# Patient Record
Sex: FEMALE | Race: WHITE | NOT HISPANIC OR LATINO | Employment: OTHER | URBAN - METROPOLITAN AREA
[De-identification: names, ages, dates, MRNs, and addresses within clinical notes are randomized per-mention and may not be internally consistent; named-entity substitution may affect disease eponyms.]

---

## 2017-03-28 ENCOUNTER — AMBULATORY - HEALTHEAST (OUTPATIENT)
Dept: CARDIOLOGY | Facility: CLINIC | Age: 77
End: 2017-03-28

## 2017-03-28 DIAGNOSIS — I34.0 MITRAL REGURGITATION: ICD-10-CM

## 2017-03-28 DIAGNOSIS — I10 HTN (HYPERTENSION): ICD-10-CM

## 2017-04-17 ENCOUNTER — RECORDS - HEALTHEAST (OUTPATIENT)
Dept: LAB | Facility: CLINIC | Age: 77
End: 2017-04-17

## 2017-04-17 LAB
CHOLEST SERPL-MCNC: 160 MG/DL
FASTING STATUS PATIENT QL REPORTED: YES
HDLC SERPL-MCNC: 47 MG/DL
LDLC SERPL CALC-MCNC: 88 MG/DL
TRIGL SERPL-MCNC: 124 MG/DL

## 2017-05-11 ENCOUNTER — ANESTHESIA - HEALTHEAST (OUTPATIENT)
Dept: SURGERY | Facility: HOSPITAL | Age: 77
End: 2017-05-11

## 2017-05-11 ASSESSMENT — MIFFLIN-ST. JEOR: SCORE: 977.57

## 2017-05-12 ENCOUNTER — SURGERY - HEALTHEAST (OUTPATIENT)
Dept: SURGERY | Facility: HOSPITAL | Age: 77
End: 2017-05-12

## 2017-05-23 ENCOUNTER — ANESTHESIA - HEALTHEAST (OUTPATIENT)
Dept: SURGERY | Facility: CLINIC | Age: 77
End: 2017-05-23

## 2017-05-24 ENCOUNTER — SURGERY - HEALTHEAST (OUTPATIENT)
Dept: SURGERY | Facility: CLINIC | Age: 77
End: 2017-05-24

## 2017-05-24 ASSESSMENT — MIFFLIN-ST. JEOR: SCORE: 977.57

## 2017-05-25 ENCOUNTER — HOME CARE/HOSPICE - HEALTHEAST (OUTPATIENT)
Dept: HOME HEALTH SERVICES | Facility: HOME HEALTH | Age: 77
End: 2017-05-25

## 2017-05-27 ENCOUNTER — COMMUNICATION - HEALTHEAST (OUTPATIENT)
Dept: SCHEDULING | Facility: CLINIC | Age: 77
End: 2017-05-27

## 2017-05-29 ENCOUNTER — HOME CARE/HOSPICE - HEALTHEAST (OUTPATIENT)
Dept: HOME HEALTH SERVICES | Facility: HOME HEALTH | Age: 77
End: 2017-05-29

## 2017-05-31 ENCOUNTER — HOME CARE/HOSPICE - HEALTHEAST (OUTPATIENT)
Dept: HOME HEALTH SERVICES | Facility: HOME HEALTH | Age: 77
End: 2017-05-31

## 2017-05-31 ENCOUNTER — AMBULATORY - HEALTHEAST (OUTPATIENT)
Dept: HOME HEALTH SERVICES | Facility: HOME HEALTH | Age: 77
End: 2017-05-31

## 2017-06-02 ENCOUNTER — HOME CARE/HOSPICE - HEALTHEAST (OUTPATIENT)
Dept: HOME HEALTH SERVICES | Facility: HOME HEALTH | Age: 77
End: 2017-06-02

## 2017-06-05 ENCOUNTER — HOME CARE/HOSPICE - HEALTHEAST (OUTPATIENT)
Dept: HOME HEALTH SERVICES | Facility: HOME HEALTH | Age: 77
End: 2017-06-05

## 2017-06-07 ENCOUNTER — HOME CARE/HOSPICE - HEALTHEAST (OUTPATIENT)
Dept: HOME HEALTH SERVICES | Facility: HOME HEALTH | Age: 77
End: 2017-06-07

## 2017-06-09 ENCOUNTER — HOME CARE/HOSPICE - HEALTHEAST (OUTPATIENT)
Dept: HOME HEALTH SERVICES | Facility: HOME HEALTH | Age: 77
End: 2017-06-09

## 2017-06-12 ENCOUNTER — HOME CARE/HOSPICE - HEALTHEAST (OUTPATIENT)
Dept: HOME HEALTH SERVICES | Facility: HOME HEALTH | Age: 77
End: 2017-06-12

## 2017-06-15 ENCOUNTER — HOSPITAL ENCOUNTER (OUTPATIENT)
Dept: CARDIOLOGY | Facility: HOSPITAL | Age: 77
Discharge: HOME | End: 2017-06-15
Attending: INTERNAL MEDICINE

## 2017-06-15 DIAGNOSIS — I10 HTN (HYPERTENSION): ICD-10-CM

## 2017-06-15 DIAGNOSIS — I34.0 MITRAL REGURGITATION: ICD-10-CM

## 2017-06-15 LAB
AORTIC ROOT: 3.3 CM
ASCENDING AORTA: 3 CM
BSA FOR ECHO PROCEDURE: 1.56 M2
CV BLOOD PRESSURE: NORMAL MMHG
CV ECHO HEIGHT: 60 IN
CV ECHO WEIGHT: 127 LBS
DOP CALC LVOT AREA: 3.14 CM2
DOP CALC LVOT DIAMETER: 2 CM
DOP CALC LVOT PEAK VEL: 90.2 CM/S
DOP CALC LVOT STROKE VOLUME: 69.7 CM3
DOP CALCLVOT PEAK VEL VTI: 22.2 CM
EJECTION FRACTION: 48 % (ref 55–75)
FRACTIONAL SHORTENING: 32.5 % (ref 28–44)
INTERVENTRICULAR SEPTUM IN END DIASTOLE: 1.21 CM (ref 0.6–0.9)
IVS/PW RATIO: 1.3
LA AREA 1: 27 CM2
LA AREA 2: 27.1 CM2
LEFT ATRIUM LENGTH: 6 CM
LEFT ATRIUM SIZE: 4.1 CM
LEFT ATRIUM TO AORTIC ROOT RATIO: 1.24 NO UNITS
LEFT ATRIUM VOLUME INDEX: 66.4 ML/M2
LEFT ATRIUM VOLUME: 103.7 CM3
LEFT VENTRICLE CARDIAC INDEX: 3.3 L/MIN/M2
LEFT VENTRICLE CARDIAC OUTPUT: 5.1 L/MIN
LEFT VENTRICLE DIASTOLIC VOLUME INDEX: 36.7 CM3/M2 (ref 34–74)
LEFT VENTRICLE DIASTOLIC VOLUME: 57.2 CM3 (ref 46–106)
LEFT VENTRICLE HEART RATE: 73 BPM
LEFT VENTRICLE MASS INDEX: 113.8 G/M2
LEFT VENTRICLE SYSTOLIC VOLUME INDEX: 19.2 CM3/M2 (ref 11–31)
LEFT VENTRICLE SYSTOLIC VOLUME: 29.9 CM3 (ref 14–42)
LEFT VENTRICULAR INTERNAL DIMENSION IN DIASTOLE: 4.62 CM (ref 3.8–5.2)
LEFT VENTRICULAR INTERNAL DIMENSION IN SYSTOLE: 3.12 CM (ref 2.2–3.5)
LEFT VENTRICULAR MASS: 177.5 G
LEFT VENTRICULAR OUTFLOW TRACT MEAN GRADIENT: 2 MMHG
LEFT VENTRICULAR OUTFLOW TRACT MEAN VELOCITY: 60.5 CM/S
LEFT VENTRICULAR OUTFLOW TRACT PEAK GRADIENT: 3 MMHG
LEFT VENTRICULAR POSTERIOR WALL IN END DIASTOLE: 0.95 CM (ref 0.6–0.9)
LV STROKE VOLUME INDEX: 44.7 ML/M2
MITRAL VALVE E/A RATIO: 0.9
MV AVERAGE E/E' RATIO: 16.3 CM/S
MV DECELERATION TIME: 283 MS
MV E'TISSUE VEL-LAT: 6.77 CM/S
MV E'TISSUE VEL-MED: 5.03 CM/S
MV LATERAL E/E' RATIO: 14.2
MV MEDIAL E/E' RATIO: 19.1
MV PEAK A VELOCITY: 103 CM/S
MV PEAK E VELOCITY: 96 CM/S
NUC REST DIASTOLIC VOLUME INDEX: 2032 LBS
NUC REST SYSTOLIC VOLUME INDEX: 60 IN
TRICUSPID VALVE ANULAR PLANE SYSTOLIC EXCURSION: 1.5 CM

## 2017-06-15 ASSESSMENT — MIFFLIN-ST. JEOR: SCORE: 977.57

## 2017-06-19 ENCOUNTER — AMBULATORY - HEALTHEAST (OUTPATIENT)
Dept: CARDIOLOGY | Facility: CLINIC | Age: 77
End: 2017-06-19

## 2017-06-22 ENCOUNTER — OFFICE VISIT - HEALTHEAST (OUTPATIENT)
Dept: CARDIOLOGY | Facility: CLINIC | Age: 77
End: 2017-06-22

## 2017-06-22 ENCOUNTER — AMBULATORY - HEALTHEAST (OUTPATIENT)
Dept: CARDIOLOGY | Facility: CLINIC | Age: 77
End: 2017-06-22

## 2017-06-22 DIAGNOSIS — I34.1 MITRAL VALVE PROLAPSE: ICD-10-CM

## 2017-06-22 DIAGNOSIS — I34.0 MITRAL REGURGITATION: ICD-10-CM

## 2017-06-22 ASSESSMENT — MIFFLIN-ST. JEOR: SCORE: 963.96

## 2017-06-28 ENCOUNTER — COMMUNICATION - HEALTHEAST (OUTPATIENT)
Dept: CARDIOLOGY | Facility: CLINIC | Age: 77
End: 2017-06-28

## 2017-06-29 ENCOUNTER — ANESTHESIA - HEALTHEAST (OUTPATIENT)
Dept: SURGERY | Facility: HOSPITAL | Age: 77
End: 2017-06-29

## 2017-06-29 ASSESSMENT — MIFFLIN-ST. JEOR: SCORE: 963.96

## 2017-06-30 ENCOUNTER — SURGERY - HEALTHEAST (OUTPATIENT)
Dept: SURGERY | Facility: HOSPITAL | Age: 77
End: 2017-06-30

## 2017-07-11 ENCOUNTER — COMMUNICATION - HEALTHEAST (OUTPATIENT)
Dept: CARDIOLOGY | Facility: CLINIC | Age: 77
End: 2017-07-11

## 2017-11-24 ENCOUNTER — RECORDS - HEALTHEAST (OUTPATIENT)
Dept: ADMINISTRATIVE | Facility: OTHER | Age: 77
End: 2017-11-24

## 2017-11-24 LAB
LAB AP CHARGES (HE HISTORICAL CONVERSION): NORMAL
PATH REPORT.COMMENTS IMP SPEC: NORMAL
PATH REPORT.COMMENTS IMP SPEC: NORMAL
PATH REPORT.FINAL DX SPEC: NORMAL
PATH REPORT.GROSS SPEC: NORMAL
PATH REPORT.MICROSCOPIC SPEC OTHER STN: NORMAL
PATH REPORT.RELEVANT HX SPEC: NORMAL
RESULT FLAG (HE HISTORICAL CONVERSION): NORMAL

## 2018-05-07 ENCOUNTER — HOSPITAL ENCOUNTER (OUTPATIENT)
Dept: MAMMOGRAPHY | Facility: CLINIC | Age: 78
Discharge: HOME | End: 2018-05-07
Attending: FAMILY MEDICINE

## 2018-05-07 DIAGNOSIS — Z12.31 VISIT FOR SCREENING MAMMOGRAM: ICD-10-CM

## 2018-09-27 ENCOUNTER — RECORDS - HEALTHEAST (OUTPATIENT)
Dept: ADMINISTRATIVE | Facility: OTHER | Age: 78
End: 2018-09-27

## 2018-09-27 ENCOUNTER — AMBULATORY - HEALTHEAST (OUTPATIENT)
Dept: CARDIOLOGY | Facility: CLINIC | Age: 78
End: 2018-09-27

## 2018-09-28 ENCOUNTER — OFFICE VISIT - HEALTHEAST (OUTPATIENT)
Dept: CARDIOLOGY | Facility: CLINIC | Age: 78
End: 2018-09-28

## 2018-09-28 DIAGNOSIS — I34.0 NON-RHEUMATIC MITRAL REGURGITATION: ICD-10-CM

## 2018-12-20 ENCOUNTER — RECORDS - HEALTHEAST (OUTPATIENT)
Dept: LAB | Facility: CLINIC | Age: 78
End: 2018-12-20

## 2018-12-20 LAB
ALBUMIN SERPL-MCNC: 4.1 G/DL (ref 3.5–5)
ALP SERPL-CCNC: 90 U/L (ref 45–120)
ALT SERPL W P-5'-P-CCNC: 20 U/L (ref 0–45)
ANION GAP SERPL CALCULATED.3IONS-SCNC: 10 MMOL/L (ref 5–18)
AST SERPL W P-5'-P-CCNC: 18 U/L (ref 0–40)
BASOPHILS # BLD AUTO: 0.1 THOU/UL (ref 0–0.2)
BASOPHILS NFR BLD AUTO: 1 % (ref 0–2)
BILIRUB SERPL-MCNC: 0.4 MG/DL (ref 0–1)
BUN SERPL-MCNC: 20 MG/DL (ref 8–28)
CALCIUM SERPL-MCNC: 9.8 MG/DL (ref 8.5–10.5)
CHLORIDE BLD-SCNC: 107 MMOL/L (ref 98–107)
CHOLEST SERPL-MCNC: 168 MG/DL
CO2 SERPL-SCNC: 25 MMOL/L (ref 22–31)
CREAT SERPL-MCNC: 0.69 MG/DL (ref 0.6–1.1)
EOSINOPHIL # BLD AUTO: 0.3 THOU/UL (ref 0–0.4)
EOSINOPHIL NFR BLD AUTO: 4 % (ref 0–6)
ERYTHROCYTE [DISTWIDTH] IN BLOOD BY AUTOMATED COUNT: 12.1 % (ref 11–14.5)
FASTING STATUS PATIENT QL REPORTED: YES
GFR SERPL CREATININE-BSD FRML MDRD: >60 ML/MIN/1.73M2
GLUCOSE BLD-MCNC: 101 MG/DL (ref 70–125)
HCT VFR BLD AUTO: 44.8 % (ref 35–47)
HDLC SERPL-MCNC: 54 MG/DL
HGB BLD-MCNC: 14.3 G/DL (ref 12–16)
LDLC SERPL CALC-MCNC: 95 MG/DL
LYMPHOCYTES # BLD AUTO: 1.9 THOU/UL (ref 0.8–4.4)
LYMPHOCYTES NFR BLD AUTO: 26 % (ref 20–40)
MCH RBC QN AUTO: 29.7 PG (ref 27–34)
MCHC RBC AUTO-ENTMCNC: 31.9 G/DL (ref 32–36)
MCV RBC AUTO: 93 FL (ref 80–100)
MONOCYTES # BLD AUTO: 0.4 THOU/UL (ref 0–0.9)
MONOCYTES NFR BLD AUTO: 6 % (ref 2–10)
NEUTROPHILS # BLD AUTO: 4.5 THOU/UL (ref 2–7.7)
NEUTROPHILS NFR BLD AUTO: 63 % (ref 50–70)
PLATELET # BLD AUTO: 290 THOU/UL (ref 140–440)
PMV BLD AUTO: 10.8 FL (ref 8.5–12.5)
POTASSIUM BLD-SCNC: 4.2 MMOL/L (ref 3.5–5)
PROT SERPL-MCNC: 6.6 G/DL (ref 6–8)
RBC # BLD AUTO: 4.81 MILL/UL (ref 3.8–5.4)
SODIUM SERPL-SCNC: 142 MMOL/L (ref 136–145)
T4 FREE SERPL-MCNC: 1 NG/DL (ref 0.7–1.8)
TRIGL SERPL-MCNC: 93 MG/DL
TSH SERPL DL<=0.005 MIU/L-ACNC: 1.83 UIU/ML (ref 0.3–5)
WBC: 7.3 THOU/UL (ref 4–11)

## 2018-12-21 LAB — 25(OH)D3 SERPL-MCNC: 50.1 NG/ML (ref 30–80)

## 2019-06-05 ENCOUNTER — OFFICE VISIT - HEALTHEAST (OUTPATIENT)
Dept: CARDIOLOGY | Facility: CLINIC | Age: 79
End: 2019-06-05

## 2019-06-05 ENCOUNTER — AMBULATORY - HEALTHEAST (OUTPATIENT)
Dept: CARDIOLOGY | Facility: CLINIC | Age: 79
End: 2019-06-05

## 2019-06-05 ENCOUNTER — RECORDS - HEALTHEAST (OUTPATIENT)
Dept: ADMINISTRATIVE | Facility: OTHER | Age: 79
End: 2019-06-05

## 2019-06-05 DIAGNOSIS — I34.1 MITRAL VALVE PROLAPSE: ICD-10-CM

## 2019-06-05 DIAGNOSIS — I34.0 NON-RHEUMATIC MITRAL REGURGITATION: ICD-10-CM

## 2019-06-05 ASSESSMENT — MIFFLIN-ST. JEOR: SCORE: 1018.39

## 2019-06-18 ENCOUNTER — HOSPITAL ENCOUNTER (OUTPATIENT)
Dept: CARDIOLOGY | Facility: HOSPITAL | Age: 79
Discharge: HOME | End: 2019-06-18
Attending: INTERNAL MEDICINE

## 2019-06-18 ASSESSMENT — MIFFLIN-ST. JEOR: SCORE: 1022.93

## 2019-06-19 LAB
AORTIC ROOT: 3 CM
AORTIC VALVE MEAN VELOCITY: 105 CM/S
AV DIMENSIONLESS INDEX VTI: 0.7
AV MEAN GRADIENT: 5 MMHG
AV PEAK GRADIENT: 6.2 MMHG
AV VALVE AREA: 2.3 CM2
AV VELOCITY RATIO: 0.7
BSA FOR ECHO PROCEDURE: 1.62 M2
CV BLOOD PRESSURE: ABNORMAL MMHG
CV ECHO HEIGHT: 60 IN
CV ECHO WEIGHT: 137 LBS
DOP CALC AO PEAK VEL: 124 CM/S
DOP CALC AO VTI: 31.3 CM
DOP CALC LVOT AREA: 3.14 CM2
DOP CALC LVOT DIAMETER: 2 CM
DOP CALC LVOT PEAK VEL: 87.9 CM/S
DOP CALC LVOT STROKE VOLUME: 71.6 CM3
DOP CALC MV VTI: 29.4 CM
DOP CALCLVOT PEAK VEL VTI: 22.8 CM
EJECTION FRACTION: 58 % (ref 55–75)
FRACTIONAL SHORTENING: 28.3 % (ref 28–44)
INTERVENTRICULAR SEPTUM IN END DIASTOLE: 1.04 CM (ref 0.6–0.9)
IVS/PW RATIO: 1.2
LA AREA 1: 22 CM2
LA AREA 2: 24 CM2
LEFT ATRIUM LENGTH: 5.3 CM
LEFT ATRIUM SIZE: 4.5 CM
LEFT ATRIUM VOLUME INDEX: 52.3 ML/M2
LEFT ATRIUM VOLUME: 84.7 ML
LEFT VENTRICLE CARDIAC INDEX: 2.8 L/MIN/M2
LEFT VENTRICLE CARDIAC OUTPUT: 4.6 L/MIN
LEFT VENTRICLE DIASTOLIC VOLUME INDEX: 35.4 CM3/M2 (ref 29–61)
LEFT VENTRICLE DIASTOLIC VOLUME: 57.4 CM3 (ref 46–106)
LEFT VENTRICLE HEART RATE: 64 BPM
LEFT VENTRICLE MASS INDEX: 89.6 G/M2
LEFT VENTRICLE SYSTOLIC VOLUME INDEX: 14.9 CM3/M2 (ref 8–24)
LEFT VENTRICLE SYSTOLIC VOLUME: 24.2 CM3 (ref 14–42)
LEFT VENTRICULAR INTERNAL DIMENSION IN DIASTOLE: 4.56 CM (ref 3.8–5.2)
LEFT VENTRICULAR INTERNAL DIMENSION IN SYSTOLE: 3.27 CM (ref 2.2–3.5)
LEFT VENTRICULAR MASS: 145.2 G
LEFT VENTRICULAR OUTFLOW TRACT MEAN GRADIENT: 2 MMHG
LEFT VENTRICULAR OUTFLOW TRACT MEAN VELOCITY: 68.4 CM/S
LEFT VENTRICULAR OUTFLOW TRACT PEAK GRADIENT: 3 MMHG
LEFT VENTRICULAR POSTERIOR WALL IN END DIASTOLE: 0.85 CM (ref 0.6–0.9)
LV STROKE VOLUME INDEX: 44.2 ML/M2
MITRAL REGURGITANT VELOCITY TIME INTEGRAL: 129 CM
MITRAL VALVE DECELERATION SLOPE: 5050 MM/S2
MITRAL VALVE E/A RATIO: 1.1
MITRAL VALVE MEAN INFLOW VELOCITY: 77.7 CM/S
MITRAL VALVE PEAK VELOCITY: 121 CM/S
MITRAL VALVE PRESSURE HALF-TIME: 67 MS
MR FLOW: 22 CM3
MR MEAN GRADIENT: 60 MMHG
MR MEAN VELOCITY: 407 CM/S
MR PEAK GRADIENT: 70.6 MMHG
MR PISA EROA: 0.2 CM2
MR PISA RADIUS: 0.6 CM
MR PISA VN NYQUIST: 30.8 CM/S
MV AREA VTI: 2.44 CM2
MV AVERAGE E/E' RATIO: 18 CM/S
MV DECELERATION TIME: 190 MS
MV E'TISSUE VEL-LAT: 6.64 CM/S
MV E'TISSUE VEL-MED: 5.66 CM/S
MV LATERAL E/E' RATIO: 16.7
MV MEAN GRADIENT: 3 MMHG
MV MEDIAL E/E' RATIO: 19.6
MV PEAK A VELOCITY: 102 CM/S
MV PEAK E VELOCITY: 111 CM/S
MV PEAK GRADIENT: 5.9 MMHG
MV REGURGITANT VOLUME: 21.4 CC
MV VALVE AREA BY CONTINUITY EQUATION: 2.4 CM2
MV VALVE AREA PRESSURE 1/2 METHOD: 3.3 CM2
NUC REST DIASTOLIC VOLUME INDEX: 2192 LBS
NUC REST SYSTOLIC VOLUME INDEX: 60 IN
PISA MR PEAK VEL: 420 CM/S
PR MAX PG: 7 MMHG
PR PEAK VELOCITY: 134 CM/S
TRICUSPID REGURGITATION PEAK PRESSURE GRADIENT: 21.3 MMHG
TRICUSPID VALVE PEAK REGURGITANT VELOCITY: 231 CM/S

## 2019-10-03 ENCOUNTER — HOSPITAL ENCOUNTER (OUTPATIENT)
Dept: MAMMOGRAPHY | Facility: CLINIC | Age: 79
Discharge: HOME | End: 2019-10-03
Attending: FAMILY MEDICINE

## 2019-10-03 DIAGNOSIS — Z12.31 VISIT FOR SCREENING MAMMOGRAM: ICD-10-CM

## 2019-12-11 ENCOUNTER — RECORDS - HEALTHEAST (OUTPATIENT)
Dept: LAB | Facility: CLINIC | Age: 79
End: 2019-12-11

## 2019-12-11 LAB
ALBUMIN SERPL-MCNC: 4.3 G/DL (ref 3.5–5)
ALP SERPL-CCNC: 80 U/L (ref 45–120)
ALT SERPL W P-5'-P-CCNC: 18 U/L (ref 0–45)
ANION GAP SERPL CALCULATED.3IONS-SCNC: 10 MMOL/L (ref 5–18)
AST SERPL W P-5'-P-CCNC: 17 U/L (ref 0–40)
BILIRUB SERPL-MCNC: 0.7 MG/DL (ref 0–1)
BUN SERPL-MCNC: 18 MG/DL (ref 8–28)
CALCIUM SERPL-MCNC: 9.7 MG/DL (ref 8.5–10.5)
CHLORIDE BLD-SCNC: 107 MMOL/L (ref 98–107)
CHOLEST SERPL-MCNC: 189 MG/DL
CO2 SERPL-SCNC: 26 MMOL/L (ref 22–31)
CREAT SERPL-MCNC: 0.68 MG/DL (ref 0.6–1.1)
FASTING STATUS PATIENT QL REPORTED: YES
GFR SERPL CREATININE-BSD FRML MDRD: >60 ML/MIN/1.73M2
GLUCOSE BLD-MCNC: 94 MG/DL (ref 70–125)
HDLC SERPL-MCNC: 48 MG/DL
LDLC SERPL CALC-MCNC: 117 MG/DL
POTASSIUM BLD-SCNC: 3.5 MMOL/L (ref 3.5–5)
PROT SERPL-MCNC: 6.4 G/DL (ref 6–8)
SODIUM SERPL-SCNC: 143 MMOL/L (ref 136–145)
T4 FREE SERPL-MCNC: 1 NG/DL (ref 0.7–1.8)
TRIGL SERPL-MCNC: 122 MG/DL
TSH SERPL DL<=0.005 MIU/L-ACNC: 2.38 UIU/ML (ref 0.3–5)

## 2019-12-12 LAB — 25(OH)D3 SERPL-MCNC: 46.6 NG/ML (ref 30–80)

## 2020-06-16 ENCOUNTER — OFFICE VISIT - HEALTHEAST (OUTPATIENT)
Dept: CARDIOLOGY | Facility: CLINIC | Age: 80
End: 2020-06-16

## 2020-06-16 DIAGNOSIS — I34.0 MITRAL VALVE INSUFFICIENCY, UNSPECIFIED ETIOLOGY: ICD-10-CM

## 2020-06-16 DIAGNOSIS — I34.1 MITRAL VALVE PROLAPSE: ICD-10-CM

## 2020-10-20 DIAGNOSIS — G40.909 SEIZURE DISORDER (H): Primary | ICD-10-CM

## 2020-10-20 PROBLEM — D62 POSTOPERATIVE ANEMIA DUE TO ACUTE BLOOD LOSS: Status: ACTIVE | Noted: 2017-05-26

## 2020-10-20 PROBLEM — Z96.651 STATUS POST RIGHT KNEE REPLACEMENT: Status: ACTIVE | Noted: 2017-05-24

## 2020-10-20 PROBLEM — K85.90 PANCREATITIS: Status: ACTIVE | Noted: 2017-04-29

## 2020-10-20 PROBLEM — F32.A DEPRESSION: Status: ACTIVE | Noted: 2017-05-24

## 2020-10-20 PROBLEM — E78.49 OTHER HYPERLIPIDEMIA: Status: ACTIVE | Noted: 2020-10-20

## 2020-10-20 PROBLEM — R79.89 LFT ELEVATION: Status: ACTIVE | Noted: 2020-10-20

## 2020-10-20 RX ORDER — LEVETIRACETAM 750 MG/1
750 TABLET ORAL 2 TIMES DAILY
Qty: 60 TABLET | Refills: 0 | Status: SHIPPED | OUTPATIENT
Start: 2020-10-20 | End: 2020-11-13

## 2020-10-20 RX ORDER — LEVETIRACETAM 750 MG/1
1 TABLET ORAL 2 TIMES DAILY
COMMUNITY
Start: 2020-07-23 | End: 2020-10-20

## 2020-10-20 NOTE — TELEPHONE ENCOUNTER
Medication T'd for review and signature  Overdue for follow up/ no future appt scheduled   Anibal Ibrahim CMA on 10/20/2020 at 8:21 AM

## 2020-10-26 ENCOUNTER — RECORDS - HEALTHEAST (OUTPATIENT)
Dept: LAB | Facility: CLINIC | Age: 80
End: 2020-10-26

## 2020-10-26 LAB
ALBUMIN SERPL-MCNC: 4.6 G/DL (ref 3.5–5)
ALP SERPL-CCNC: 75 U/L (ref 45–120)
ALT SERPL W P-5'-P-CCNC: 17 U/L (ref 0–45)
ANION GAP SERPL CALCULATED.3IONS-SCNC: 13 MMOL/L (ref 5–18)
AST SERPL W P-5'-P-CCNC: 16 U/L (ref 0–40)
BILIRUB SERPL-MCNC: 0.6 MG/DL (ref 0–1)
BUN SERPL-MCNC: 18 MG/DL (ref 8–28)
CALCIUM SERPL-MCNC: 10 MG/DL (ref 8.5–10.5)
CHLORIDE BLD-SCNC: 105 MMOL/L (ref 98–107)
CHOLEST SERPL-MCNC: 171 MG/DL
CO2 SERPL-SCNC: 23 MMOL/L (ref 22–31)
CREAT SERPL-MCNC: 0.73 MG/DL (ref 0.6–1.1)
FASTING STATUS PATIENT QL REPORTED: ABNORMAL
GFR SERPL CREATININE-BSD FRML MDRD: >60 ML/MIN/1.73M2
GLUCOSE BLD-MCNC: 98 MG/DL (ref 70–125)
HDLC SERPL-MCNC: 50 MG/DL
LDLC SERPL CALC-MCNC: 89 MG/DL
POTASSIUM BLD-SCNC: 3.8 MMOL/L (ref 3.5–5)
PROT SERPL-MCNC: 7 G/DL (ref 6–8)
SODIUM SERPL-SCNC: 141 MMOL/L (ref 136–145)
TRIGL SERPL-MCNC: 158 MG/DL
TSH SERPL DL<=0.005 MIU/L-ACNC: 1.93 UIU/ML (ref 0.3–5)

## 2020-10-27 LAB — 25(OH)D3 SERPL-MCNC: 64.8 NG/ML (ref 30–80)

## 2020-11-13 ENCOUNTER — VIRTUAL VISIT (OUTPATIENT)
Dept: NEUROLOGY | Facility: CLINIC | Age: 80
End: 2020-11-13
Payer: COMMERCIAL

## 2020-11-13 VITALS — HEIGHT: 60 IN | BODY MASS INDEX: 25.52 KG/M2 | WEIGHT: 130 LBS

## 2020-11-13 DIAGNOSIS — G40.909 SEIZURE DISORDER (H): ICD-10-CM

## 2020-11-13 PROCEDURE — 99213 OFFICE O/P EST LOW 20 MIN: CPT | Mod: 95 | Performed by: PSYCHIATRY & NEUROLOGY

## 2020-11-13 RX ORDER — CLINDAMYCIN HCL 300 MG
1 CAPSULE ORAL PRN
COMMUNITY
Start: 2020-10-26

## 2020-11-13 RX ORDER — DILTIAZEM HYDROCHLORIDE 360 MG/1
360 CAPSULE, EXTENDED RELEASE ORAL EVERY EVENING
COMMUNITY

## 2020-11-13 RX ORDER — LOSARTAN POTASSIUM 50 MG/1
1 TABLET ORAL DAILY
COMMUNITY
Start: 2020-08-20

## 2020-11-13 RX ORDER — SIMVASTATIN 20 MG
20 TABLET ORAL AT BEDTIME
COMMUNITY

## 2020-11-13 RX ORDER — ASPIRIN 81 MG/1
81 TABLET, CHEWABLE ORAL DAILY
COMMUNITY

## 2020-11-13 RX ORDER — LEVETIRACETAM 750 MG/1
750 TABLET ORAL 2 TIMES DAILY
Qty: 180 TABLET | Refills: 3 | Status: SHIPPED | OUTPATIENT
Start: 2020-11-13 | End: 2021-11-02

## 2020-11-13 ASSESSMENT — MIFFLIN-ST. JEOR: SCORE: 996.18

## 2020-11-13 NOTE — LETTER
11/13/2020         RE: Silvia Finley  [address] [facility] [address]        Dear Colleague,    Thank you for referring your patient, Silvia Finley, to the [facility]UR[facility] [place]. Please see a copy of my visit note below.    [facility]  [place]    Silvia Finley MRN# [id number]   Age: 79 year old YOB: 1940               Assessment and Plan:   Assessment:   Seizures, well controlled on Keppra        Plan:     I renewed the Keppra prescription through her [facility] pharmacy electronically.  We will plan to meet again in a year, sooner if need be.             Chief Complaint/HPI:     I spoke to Silvia on the phone with her permission for a follow-up visit today.  She is 79 years old now.  She has a longstanding history of seizures.  They began in her early 20s around the time of childbirth.  She was on Depakote for a few years and then stopped.  Later she had a couple seizures during a stressful time and was put back on Depakote.  The Depakote controlled the seizures well but she developed hair loss and in 2016 was seen here in our clinic and switched to levetiracetam.  She had some more seizures in 2016 and 2017.  Again she was in a stressful situation and may have missed doses of her levetiracetam.  I did increase her dose to 750 mg twice a day, she has been more diligent about taking it twice a day as directed and has not had any further seizures since August 2017.  She has no side effects from the Keppra.  The descriptions of the seizures are generalized tonic-clonic (fall to the floor, shaking all over, strange noises).            Past Medical History:    has a past medical history of Hyperlipidemia and Hypertension.          Past Surgical History:    has no past surgical history on file.          Social History:     Social History     Tobacco Use     Smoking status: Never Smoker     Smokeless tobacco: Never Used   Substance Use Topics     Alcohol use:  Yes     Comment: drinks wine 2-3 times a week              Family History:     Family History   Problem Relation Age of Onset     Cerebrovascular Disease Mother      No Known Problems Father                 Allergies:     Allergies   Allergen Reactions     Tetanus Toxoid      Cefuroxime Hives and Rash     Penicillins Hives and Rash             Medications:     Current Outpatient Medications:      aspirin (ASA) 81 MG chewable tablet, Take 81 mg by mouth daily, Disp: , Rfl:      cholecalciferol (VITAMIN D3) 25 mcg (1000 units) capsule, Take 1,000 Units by mouth daily, Disp: , Rfl:      clindamycin (CLEOCIN) 300 MG capsule, Take 1 capsule by mouth as needed Takes prior to dental appts, Disp: , Rfl:      diltiazem ER (TIAZAC) 360 MG 24 hr ER beaded capsule, Take 360 mg by mouth every evening, Disp: , Rfl:      levETIRAcetam (KEPPRA) 750 MG tablet, Take 1 tablet (750 mg) by mouth 2 times daily, Disp: 60 tablet, Rfl: 0     losartan (COZAAR) 50 MG tablet, Take 1 tablet by mouth daily, Disp: , Rfl:      sertraline (ZOLOFT) 50 MG tablet, Take 50 mg by mouth daily, Disp: , Rfl:      simvastatin (ZOCOR) 20 MG tablet, Take 20 mg by mouth At Bedtime, Disp: , Rfl:            Review of Systems:   No difficulty breathing or swallowing            Physical Exam:   Awake and alert with no aphasia no dysarthria  Speech is clear and coherent  Further exam is not feasible over the phone       13 minutes were spent on the phone today.    Mukesh Louise MD             Again, thank you for allowing me to participate in the care of your patient.        Sincerely,        Mukesh Louise MD

## 2020-11-13 NOTE — PATIENT INSTRUCTIONS
Patient Education     Treating Epilepsy: Medicines     Take your medicines exactly as directed by your healthcare provider.   If you ve been diagnosed with epilepsy, your healthcare provider will create a treatment plan for you. Medicines called antiepileptic drugs (AEDs) are the primary treatment for epilepsy. These medicines greatly reduce or prevent seizures in most people who take them. For some people, other treatment choices may be available. Treating epilepsy can be difficult when medicine is not keeping seizures under control (refractory epilepsy), or when medicines have harsh side effects. It can also be difficult to treat epilepsy in pregnant women.   Your medicine plan  Your healthcare provider will work with you to create the best medicine plan for you:    Type of medicine. There are many types of AEDs. The first type you try will likely help you. If not, your healthcare provider may suggest another type, or a combination of AEDs. If you plan to become pregnant or are already pregnant, your medicines might need to be adjusted by your provider to protect your developing baby.     Dosage. You will probably be started at a low dosage. The dosage will be slowly increased until your seizures are better controlled or a target dosage is reached.    Rescue medicines. Your treatment plan may include special medicines to stop seizures. They can be given to you during a seizure only by someone who has been specially instructed by a healthcare provider.  After you start taking medicines, you may have follow-up testing. These tests measure the level of medicine in your blood. Eventually, you ll need to have these tests from time to time. But certain medicines don t require lab testing. Your healthcare provider may also need to check certain blood tests to watch for side effects while on AEDs.  When taking epilepsy medicines  DO take your medicines exactly as directed.  DO keep a current list of all medicines you re  taking and show it to your healthcare provider. Make sure you show the list and ask about interactions with any healthcare provider prescribing new medicine for you.  DO know that certain epilepsy medicines can interfere with how birth control pills work.  DO store pills in a cool, dry place (not in the bathroom).  DON T stop taking your medicines, skip a dose, or change your medicine amount without your healthcare provider s approval.  DON T change brands of medicine (usually generic medicines are OK), or even forms of 1 brand (from tablet to liquid, for instance), without your healthcare provider's approval.  DON T take herbal supplements or antacids without talking to your healthcare provider first. Ask your pharmacist about taking over-the-counter medicines.  Possible side effects of epilepsy medicines  Epilepsy medicines often have effects that are not intended (side effects). Most of these effects go away after a few weeks. The most common side effects of epilepsy medicines include:    Dizziness    Trouble concentrating    Tiredness    Stomach upset    Weight gain or loss    Depression    Allergic reaction such as a rash or fever  Other treatments for epilepsy    Brain surgery. Brain surgery may sound scary, but it may be a choice if you are still having seizures while on medicine. It can greatly reduce or get rid of seizures, without causing loss of function. It impacts small parts of your brain that cause seizures, leaving the rest of your brain unharmed. In most cases, only people whose seizures start as partial seizures can have the procedure.    Vagus nerve stimulation (VNS). A device is placed under the skin in your chest. The device is connected to a nerve in your neck called the vagus nerve. The device sends electrical impulses through your vagus nerve to your brain. The impulses have been shown to help reduce seizures.    Brain stimulation. A newer device is now available to stimulate a part of the  brain to prevent a seizure from spreading. This is not for all types of seizures and only for adults with epilepsy.   [employer] last reviewed this educational content on 11/1/2017 2000-2020 [employer] [address] [place] [address], [place] [address]. All rights reserved. This information is not intended as a substitute for professional medical care. Always follow your healthcare professional's instructions.

## 2020-11-13 NOTE — PROGRESS NOTES
[facility] Neu[facility]  [place]    Silvia Finley MRN# [id number]   Age: 79 year old YOB: 1940               Assessment and Plan:   Assessment:   Seizures, well controlled on Keppra        Plan:     I renewed the Keppra prescription through her [facility] pharmacy electronically.  We will plan to meet again in a year, sooner if need be.             Chief Complaint/HPI:     I spoke to Silvia on the phone with her permission for a follow-up visit today.  She is 79 years old now.  She has a longstanding history of seizures.  They began in her early 20s around the time of childbirth.  She was on Depakote for a few years and then stopped.  Later she had a couple seizures during a stressful time and was put back on Depakote.  The Depakote controlled the seizures well but she developed hair loss and in 2016 was seen here in our clinic and switched to levetiracetam.  She had some more seizures in 2016 and 2017.  Again she was in a stressful situation and may have missed doses of her levetiracetam.  I did increase her dose to 750 mg twice a day, she has been more diligent about taking it twice a day as directed and has not had any further seizures since August 2017.  She has no side effects from the Keppra.  The descriptions of the seizures are generalized tonic-clonic (fall to the floor, shaking all over, strange noises).            Past Medical History:    has a past medical history of Hyperlipidemia and Hypertension.          Past Surgical History:    has no past surgical history on file.          Social History:     Social History     Tobacco Use     Smoking status: Never Smoker     Smokeless tobacco: Never Used   Substance Use Topics     Alcohol use: Yes     Comment: drinks wine 2-3 times a week              Family History:     Family History   Problem Relation Age of Onset     Cerebrovascular Disease Mother      No Known Problems Father                 Allergies:     Allergies   Allergen Reactions      Tetanus Toxoid      Cefuroxime Hives and Rash     Penicillins Hives and Rash             Medications:     Current Outpatient Medications:      aspirin (ASA) 81 MG chewable tablet, Take 81 mg by mouth daily, Disp: , Rfl:      cholecalciferol (VITAMIN D3) 25 mcg (1000 units) capsule, Take 1,000 Units by mouth daily, Disp: , Rfl:      clindamycin (CLEOCIN) 300 MG capsule, Take 1 capsule by mouth as needed Takes prior to dental appts, Disp: , Rfl:      diltiazem ER (TIAZAC) 360 MG 24 hr ER beaded capsule, Take 360 mg by mouth every evening, Disp: , Rfl:      levETIRAcetam (KEPPRA) 750 MG tablet, Take 1 tablet (750 mg) by mouth 2 times daily, Disp: 60 tablet, Rfl: 0     losartan (COZAAR) 50 MG tablet, Take 1 tablet by mouth daily, Disp: , Rfl:      sertraline (ZOLOFT) 50 MG tablet, Take 50 mg by mouth daily, Disp: , Rfl:      simvastatin (ZOCOR) 20 MG tablet, Take 20 mg by mouth At Bedtime, Disp: , Rfl:            Review of Systems:   No difficulty breathing or swallowing            Physical Exam:   Awake and alert with no aphasia no dysarthria  Speech is clear and coherent  Further exam is not feasible over the phone       13 minutes were spent on the phone today.    Mukesh Louise MD

## 2020-11-30 ENCOUNTER — COMMUNICATION - HEALTHEAST (OUTPATIENT)
Dept: CARDIOLOGY | Facility: CLINIC | Age: 80
End: 2020-11-30

## 2020-12-09 ENCOUNTER — COMMUNICATION - HEALTHEAST (OUTPATIENT)
Dept: CARDIOLOGY | Facility: CLINIC | Age: 80
End: 2020-12-09

## 2020-12-10 ENCOUNTER — OFFICE VISIT - HEALTHEAST (OUTPATIENT)
Dept: CARDIOLOGY | Facility: CLINIC | Age: 80
End: 2020-12-10

## 2020-12-10 ENCOUNTER — RECORDS - HEALTHEAST (OUTPATIENT)
Dept: ADMINISTRATIVE | Facility: OTHER | Age: 80
End: 2020-12-10

## 2020-12-10 ENCOUNTER — AMBULATORY - HEALTHEAST (OUTPATIENT)
Dept: CARDIOLOGY | Facility: CLINIC | Age: 80
End: 2020-12-10

## 2020-12-10 DIAGNOSIS — R00.2 PALPITATIONS: ICD-10-CM

## 2020-12-10 DIAGNOSIS — I49.3 PVC'S (PREMATURE VENTRICULAR CONTRACTIONS): ICD-10-CM

## 2020-12-10 LAB — MAGNESIUM SERPL-MCNC: 2.1 MG/DL (ref 1.8–2.6)

## 2020-12-14 ENCOUNTER — HOSPITAL ENCOUNTER (OUTPATIENT)
Dept: CARDIOLOGY | Facility: CLINIC | Age: 80
Discharge: HOME | End: 2020-12-14
Attending: INTERNAL MEDICINE

## 2020-12-14 DIAGNOSIS — I34.0 MITRAL VALVE INSUFFICIENCY, UNSPECIFIED ETIOLOGY: ICD-10-CM

## 2020-12-14 DIAGNOSIS — I34.1 MITRAL VALVE PROLAPSE: ICD-10-CM

## 2020-12-14 LAB
AORTIC ROOT: 2.8 CM
BSA FOR ECHO PROCEDURE: 1.6 M2
CV BLOOD PRESSURE: ABNORMAL MMHG
CV ECHO HEIGHT: 60 IN
CV ECHO WEIGHT: 134 LBS
DOP CALC LVOT AREA: 3.14 CM2
DOP CALC LVOT DIAMETER: 2 CM
DOP CALC LVOT PEAK VEL: 85.7 CM/S
DOP CALC LVOT STROKE VOLUME: 57.8 CM3
DOP CALCLVOT PEAK VEL VTI: 18.4 CM
EJECTION FRACTION: 57 % (ref 55–75)
FRACTIONAL SHORTENING: 32.8 % (ref 28–44)
INTERVENTRICULAR SEPTUM IN END DIASTOLE: 1.05 CM (ref 0.6–0.9)
IVS/PW RATIO: 1
LA AREA 1: 19.7 CM2
LA AREA 2: 20.9 CM2
LEFT ATRIUM LENGTH: 5.18 CM
LEFT ATRIUM SIZE: 4.2 CM
LEFT ATRIUM TO AORTIC ROOT RATIO: 1.5 NO UNITS
LEFT ATRIUM VOLUME INDEX: 42.2 ML/M2
LEFT ATRIUM VOLUME: 67.6 ML
LEFT VENTRICLE DIASTOLIC VOLUME INDEX: 27.5 CM3/M2 (ref 29–61)
LEFT VENTRICLE DIASTOLIC VOLUME: 44 CM3 (ref 46–106)
LEFT VENTRICLE MASS INDEX: 113 G/M2
LEFT VENTRICLE SYSTOLIC VOLUME INDEX: 11.9 CM3/M2 (ref 8–24)
LEFT VENTRICLE SYSTOLIC VOLUME: 19 CM3 (ref 14–42)
LEFT VENTRICULAR INTERNAL DIMENSION IN DIASTOLE: 4.82 CM (ref 3.8–5.2)
LEFT VENTRICULAR INTERNAL DIMENSION IN SYSTOLE: 3.24 CM (ref 2.2–3.5)
LEFT VENTRICULAR MASS: 180.8 G
LEFT VENTRICULAR OUTFLOW TRACT MEAN GRADIENT: 1 MMHG
LEFT VENTRICULAR OUTFLOW TRACT MEAN VELOCITY: 56.2 CM/S
LEFT VENTRICULAR OUTFLOW TRACT PEAK GRADIENT: 3 MMHG
LEFT VENTRICULAR POSTERIOR WALL IN END DIASTOLE: 1.03 CM (ref 0.6–0.9)
LV STROKE VOLUME INDEX: 36.1 ML/M2
MITRAL REGURGITANT VELOCITY TIME INTEGRAL: 160 CM
MITRAL VALVE E/A RATIO: 1.1
MR FLOW: 42 CM3
MR MEAN GRADIENT: 61 MMHG
MR MEAN GRADIENT: 61 MMHG
MR MEAN VELOCITY: 373 CM/S
MR MEAN VELOCITY: 373 CM/S
MR PEAK GRADIENT: 91 MMHG
MR PISA EROA: 0.3 CM2
MR PISA RADIUS: 0.8 CM
MR PISA VN NYQUIST: 30.8 CM/S
MV AVERAGE E/E' RATIO: 19.1 CM/S
MV DECELERATION TIME: 257 MS
MV E'TISSUE VEL-LAT: 5.95 CM/S
MV E'TISSUE VEL-MED: 5.26 CM/S
MV LATERAL E/E' RATIO: 18
MV MEDIAL E/E' RATIO: 20.3
MV PEAK A VELOCITY: 95.3 CM/S
MV PEAK E VELOCITY: 107 CM/S
MV REGURGITANT VOLUME: 41.5 CC
NUC REST DIASTOLIC VOLUME INDEX: 2144 LBS
NUC REST SYSTOLIC VOLUME INDEX: 60 IN
PISA MR PEAK VEL: 477 CM/S
TRICUSPID REGURGITATION PEAK PRESSURE GRADIENT: 25 MMHG
TRICUSPID VALVE ANULAR PLANE SYSTOLIC EXCURSION: 2.1 CM
TRICUSPID VALVE PEAK REGURGITANT VELOCITY: 250 CM/S

## 2020-12-14 ASSESSMENT — MIFFLIN-ST. JEOR: SCORE: 1009.32

## 2020-12-15 ENCOUNTER — AMBULATORY - HEALTHEAST (OUTPATIENT)
Dept: CARDIOLOGY | Facility: CLINIC | Age: 80
End: 2020-12-15

## 2021-01-10 ENCOUNTER — HEALTH MAINTENANCE LETTER (OUTPATIENT)
Age: 81
End: 2021-01-10

## 2021-01-11 ENCOUNTER — OFFICE VISIT - HEALTHEAST (OUTPATIENT)
Dept: CARDIOLOGY | Facility: CLINIC | Age: 81
End: 2021-01-11

## 2021-01-11 DIAGNOSIS — I34.0 MITRAL REGURGITATION DUE TO CUSP PROLAPSE: ICD-10-CM

## 2021-01-11 DIAGNOSIS — I34.1 MITRAL REGURGITATION DUE TO CUSP PROLAPSE: ICD-10-CM

## 2021-02-11 ENCOUNTER — AMBULATORY - HEALTHEAST (OUTPATIENT)
Dept: NURSING | Facility: CLINIC | Age: 81
End: 2021-02-11

## 2021-03-04 ENCOUNTER — AMBULATORY - HEALTHEAST (OUTPATIENT)
Dept: NURSING | Facility: CLINIC | Age: 81
End: 2021-03-04

## 2021-05-11 ENCOUNTER — RECORDS - HEALTHEAST (OUTPATIENT)
Dept: LAB | Facility: CLINIC | Age: 81
End: 2021-05-11

## 2021-05-11 LAB
ALBUMIN SERPL-MCNC: 4.5 G/DL (ref 3.5–5)
ALP SERPL-CCNC: 83 U/L (ref 45–120)
ALT SERPL W P-5'-P-CCNC: 15 U/L (ref 0–45)
ANION GAP SERPL CALCULATED.3IONS-SCNC: 12 MMOL/L (ref 5–18)
AST SERPL W P-5'-P-CCNC: 17 U/L (ref 0–40)
BILIRUB SERPL-MCNC: 0.6 MG/DL (ref 0–1)
BUN SERPL-MCNC: 18 MG/DL (ref 8–28)
CALCIUM SERPL-MCNC: 9.6 MG/DL (ref 8.5–10.5)
CHLORIDE BLD-SCNC: 106 MMOL/L (ref 98–107)
CHOLEST SERPL-MCNC: 164 MG/DL
CO2 SERPL-SCNC: 24 MMOL/L (ref 22–31)
CREAT SERPL-MCNC: 0.74 MG/DL (ref 0.6–1.1)
FASTING STATUS PATIENT QL REPORTED: YES
GFR SERPL CREATININE-BSD FRML MDRD: >60 ML/MIN/1.73M2
GLUCOSE BLD-MCNC: 89 MG/DL (ref 70–125)
HDLC SERPL-MCNC: 51 MG/DL
LDLC SERPL CALC-MCNC: 93 MG/DL
POTASSIUM BLD-SCNC: 4.1 MMOL/L (ref 3.5–5)
PROT SERPL-MCNC: 7 G/DL (ref 6–8)
SODIUM SERPL-SCNC: 142 MMOL/L (ref 136–145)
TRIGL SERPL-MCNC: 101 MG/DL
TSH SERPL DL<=0.005 MIU/L-ACNC: 1.95 UIU/ML (ref 0.3–5)

## 2021-05-12 LAB — 25(OH)D3 SERPL-MCNC: 79.9 NG/ML (ref 30–80)

## 2021-05-24 ENCOUNTER — RECORDS - HEALTHEAST (OUTPATIENT)
Dept: ADMINISTRATIVE | Facility: CLINIC | Age: 81
End: 2021-05-24

## 2021-05-25 ENCOUNTER — RECORDS - HEALTHEAST (OUTPATIENT)
Dept: ADMINISTRATIVE | Facility: CLINIC | Age: 81
End: 2021-05-25

## 2021-05-26 ENCOUNTER — RECORDS - HEALTHEAST (OUTPATIENT)
Dept: ADMINISTRATIVE | Facility: CLINIC | Age: 81
End: 2021-05-26

## 2021-05-27 ENCOUNTER — RECORDS - HEALTHEAST (OUTPATIENT)
Dept: ADMINISTRATIVE | Facility: CLINIC | Age: 81
End: 2021-05-27

## 2021-05-27 VITALS — HEART RATE: 72 BPM | DIASTOLIC BLOOD PRESSURE: 68 MMHG | SYSTOLIC BLOOD PRESSURE: 122 MMHG

## 2021-05-28 ENCOUNTER — RECORDS - HEALTHEAST (OUTPATIENT)
Dept: ADMINISTRATIVE | Facility: CLINIC | Age: 81
End: 2021-05-28

## 2021-05-30 VITALS — BODY MASS INDEX: 24.94 KG/M2 | HEIGHT: 60 IN | WEIGHT: 127 LBS

## 2021-05-31 VITALS — BODY MASS INDEX: 24.94 KG/M2 | WEIGHT: 127 LBS | HEIGHT: 60 IN

## 2021-05-31 VITALS — BODY MASS INDEX: 24.35 KG/M2 | HEIGHT: 60 IN | WEIGHT: 124 LBS

## 2021-05-31 VITALS — HEIGHT: 60 IN | BODY MASS INDEX: 24.17 KG/M2 | WEIGHT: 123.13 LBS

## 2021-05-31 VITALS — HEIGHT: 60 IN | BODY MASS INDEX: 24.94 KG/M2 | WEIGHT: 127 LBS

## 2021-06-01 ENCOUNTER — RECORDS - HEALTHEAST (OUTPATIENT)
Dept: ADMINISTRATIVE | Facility: CLINIC | Age: 81
End: 2021-06-01

## 2021-06-02 VITALS — BODY MASS INDEX: 26.95 KG/M2 | WEIGHT: 138 LBS

## 2021-06-03 VITALS — HEIGHT: 60 IN | WEIGHT: 137 LBS | BODY MASS INDEX: 26.9 KG/M2

## 2021-06-03 VITALS — HEIGHT: 60 IN | WEIGHT: 136 LBS | BODY MASS INDEX: 26.7 KG/M2

## 2021-06-05 VITALS — WEIGHT: 134 LBS | BODY MASS INDEX: 26.31 KG/M2 | HEIGHT: 60 IN

## 2021-06-05 VITALS
OXYGEN SATURATION: 97 % | BODY MASS INDEX: 26.17 KG/M2 | WEIGHT: 134 LBS | RESPIRATION RATE: 16 BRPM | HEART RATE: 95 BPM | DIASTOLIC BLOOD PRESSURE: 76 MMHG | SYSTOLIC BLOOD PRESSURE: 142 MMHG

## 2021-06-05 VITALS
RESPIRATION RATE: 16 BRPM | BODY MASS INDEX: 26.37 KG/M2 | OXYGEN SATURATION: 97 % | WEIGHT: 135 LBS | DIASTOLIC BLOOD PRESSURE: 82 MMHG | SYSTOLIC BLOOD PRESSURE: 128 MMHG | HEART RATE: 82 BPM

## 2021-06-10 NOTE — ANESTHESIA PREPROCEDURE EVALUATION
Anesthesia Evaluation      Patient summary reviewed   No history of anesthetic complications     Airway   Mallampati: II  Neck ROM: full   Pulmonary - negative ROS and normal exam                          Cardiovascular - normal exam  Exercise tolerance: > or = 4 METS  (+) hypertension, valvular problems/murmurs (Hx rheumatic fever with moderate mitral regurgitation.) MR, dysrhythmias (Hx palpitations), , hypercholesterolemia,     ECG reviewed     ROS comment: Echo 4-21-16:  Summary  1. Normal left ventricular size and systolic performance. The ejection  fraction is estimated to be 60-65%.  2. There is mild prolapse of the posterior mitral valve leaflet. There is  moderate mitral regurgitation (anteriorly directed jet).  3. The left atrium is mildly enlarged.    Hx cardiomegaly     Neuro/Psych    (+) seizures (Last Sz .), depression,   (-) no CVA    Endo/Other    (+) arthritis,   (-) no diabetes     GI/Hepatic/Renal    (-) GERD    Comments: Recent pancreatitis and elevated LFT's.     Other findings: Hgb 14.1, Plts 285K,  Cr 0.68,  AST 16,  ALT 88,  K 4.2      Dental    (+) bridge    Comment: Temporary bridge upper right.                       Anesthesia Plan  Planned anesthetic: MAC  Ketofol IV sedation  Glycopyrrolate 0.2 mg IV  Minimize fentanyl  GA PRN  ASA 3   Induction: intravenous   Anesthetic plan and risks discussed with: patient and spouse  Anesthesia plan special considerations: antiemetics,   Post-op plan: routine recovery

## 2021-06-10 NOTE — ANESTHESIA POSTPROCEDURE EVALUATION
Patient: Silvia Finley  ENDOSCOPIC ULTRASOUND  Anesthesia type: MAC    Patient location: PACU  Last vitals:   Vitals:    05/12/17 0830   BP: 148/65   Pulse:    Resp: 16   Temp:    SpO2:      Post vital signs: stable  Level of consciousness: awake and responds to simple questions  Post-anesthesia pain: pain controlled  Post-anesthesia nausea and vomiting: no  Pulmonary: unassisted, return to baseline  Cardiovascular: stable and blood pressure at baseline  Hydration: adequate  Anesthetic events: no    QCDR Measures:  ASA# 11 - Lynette-op Cardiac Arrest: ASA11B - Patient did NOT experience unanticipated cardiac arrest  ASA# 12 - Lynette-op Mortality Rate: ASA12B - Patient did NOT die  ASA# 13 - PACU Re-Intubation Rate: NA - No ETT / LMA used for case  ASA# 10 - Composite Anes Safety: ASA10A - No serious adverse event  ASA# 38 - New Corneal Injury: ASA38A - No new exposure keratitis or corneal abrasion in PACU    Additional Notes:

## 2021-06-10 NOTE — ANESTHESIA PROCEDURE NOTES
Peripheral Block    Patient location during procedure: pre-op  Start time: 5/24/2017 7:51 AM  End time: 5/24/2017 7:53 AM  post-op analgesia per surgeon order as noted in medical record  Staffing:  Performing  Anesthesiologist: JAYASHREE PEREZ  Preanesthetic Checklist  Completed: patient identified, site marked, risks, benefits, and alternatives discussed, timeout performed, consent obtained, airway assessed, oxygen available, suction available, emergency drugs available and hand hygiene performed  Peripheral Block  Block type: saphenous, adductor canal block  Prep: ChloraPrep  Patient position: supine  Patient monitoring: cardiac monitor, continuous pulse oximetry, blood pressure and heart rate  Laterality: right  Injection technique: ultrasound guided    Ultrasound used to visualize needle placement in proximity to nerve being blocked: yes   Permanent ultrasound image captured for medical record  lidocaine 1% local anesthesia used for local skin prep  Needle  Needle type: echogenic   Needle gauge: 20G  Needle length: 4 in  no peripheral nerve catheter placed  Assessment  Injection assessment: no difficulty with injection, negative aspiration for heme, no paresthesia on injection and incremental injection

## 2021-06-10 NOTE — ANESTHESIA PROCEDURE NOTES
Peripheral Block    Patient location during procedure: pre-op  Start time: 5/24/2017 7:45 AM  End time: 5/24/2017 7:50 AM  post-op analgesia per surgeon order as noted in medical record  Staffing:  Performing  Anesthesiologist: JAYASHREE PEREZ  Preanesthetic Checklist  Completed: patient identified, site marked, risks, benefits, and alternatives discussed, timeout performed, consent obtained, airway assessed, oxygen available, suction available, emergency drugs available and hand hygiene performed  Peripheral Block  Block type: sciaticAnesthesia block type: selective tibial.  Prep: ChloraPrep  Patient position: supine  Patient monitoring: cardiac monitor, continuous pulse oximetry, heart rate and blood pressure  Laterality: right  Injection technique: ultrasound guided    Ultrasound used to visualize needle placement in proximity to nerve being blocked: yes   Permanent ultrasound image captured for medical record  lidocaine 1% local anesthesia used for local skin prep  Needle  Needle type: echogenic   Needle gauge: 20G  Needle length: 4 in  no peripheral nerve catheter placed  Assessment  Injection assessment: no difficulty with injection, negative aspiration for heme, no paresthesia on injection and incremental injection

## 2021-06-10 NOTE — ANESTHESIA POSTPROCEDURE EVALUATION
Patient: Silvia Finley  RIGHT TOTAL KNEE ARTHROPLASTY  Anesthesia type: spinal    Patient location: PACU  Last vitals:   Vitals:    05/24/17 1410   BP: 160/67   Pulse: 79   Resp: 16   Temp:    SpO2: 96%     Post vital signs: stable  Level of consciousness: awake, alert and oriented  Post-anesthesia pain: pain controlled  Post-anesthesia nausea and vomiting: no  Pulmonary: unassisted, return to baseline  Cardiovascular: stable and blood pressure at baseline  Hydration: adequate  Anesthetic events: no    QCDR Measures:  ASA# 11 - Lynette-op Cardiac Arrest: ASA11B - Patient did NOT experience unanticipated cardiac arrest  ASA# 12 - Lynette-op Mortality Rate: ASA12B - Patient did NOT die  ASA# 13 - PACU Re-Intubation Rate: NA - No ETT / LMA used for case  ASA# 10 - Composite Anes Safety: ASA10A - No serious adverse event  ASA# 38 - New Corneal Injury: ASA38A - No new exposure keratitis or corneal abrasion in PACU    Additional Notes:

## 2021-06-10 NOTE — ANESTHESIA CARE TRANSFER NOTE
Last vitals:   Vitals:    05/24/17 1041   BP: 123/60   Pulse: 80   Resp: 16   Temp:    SpO2: 100%     Patient's level of consciousness is drowsy  Spontaneous respirations: yes  Maintains airway independently: yes  Dentition unchanged: yes  Oropharynx: oropharynx clear of all foreign objects    QCDR Measures:  ASA# 20 - Surgical Safety Checklist: ASA20A - Safety Checks Done  PQRS# 430 - Adult PONV Prevention: NA - Not adult patient, not GA or 3 or more risk factors NOT present  ASA# 8 - Peds PONV Prevention: NA - Not pediatric patient, not GA or 2 or more risk factors NOT present  PQRS# 424 - Lynette-op Temp Management: 4559F - At least one body temp DOCUMENTED => 35.5C or 95.9F within required timeframe  PQRS# 426 - PACU Transfer Protocol: - Transfer of care checklist used  ASA# 14 - Acute Post-op Pain: ASA14B - Patient did NOT experience pain >= 7 out of 10

## 2021-06-10 NOTE — ANESTHESIA CARE TRANSFER NOTE
Last vitals:   Vitals:    05/12/17 0811   BP: (P) 131/60   Pulse: (P) 78   Resp: (P) 14   Temp:    SpO2: (P) 96%     Patient's level of consciousness is drowsy  Spontaneous respirations: yes  Maintains airway independently: yes  Dentition unchanged: yes  Oropharynx: oropharynx clear of all foreign objects    QCDR Measures:  ASA# 20 - Surgical Safety Checklist: ASA20A - Safety Checks Done  PQRS# 430 - Adult PONV Prevention: 4558F - Pt received => 2 anti-emetic agents (different classes) preop & intraop  ASA# 8 - Peds PONV Prevention: NA - Not pediatric patient, not GA or 2 or more risk factors NOT present  PQRS# 424 - Lynette-op Temp Management: 4559F - At least one body temp DOCUMENTED => 35.5C or 95.9F within required timeframe  PQRS# 426 - PACU Transfer Protocol: - Transfer of care checklist used  ASA# 14 - Acute Post-op Pain: ASA14B - Patient did NOT experience pain >= 7 out of 10    I completed my SBAR handoff to the receiving nurse per policy and procedure.

## 2021-06-11 NOTE — ANESTHESIA PREPROCEDURE EVALUATION
Anesthesia Evaluation      Patient summary reviewed   No history of anesthetic complications     Airway   Mallampati: II  Neck ROM: full   Pulmonary - negative ROS and normal exam                          Cardiovascular   Exercise tolerance: < 4 METS  (+) hypertension, valvular problems/murmurs MR and MVP, cardiomyopathy, hypercholesterolemia,     Rhythm: regular  Rate: normal,         Neuro/Psych    (+) seizures well controlled, depression,     Endo/Other - negative ROS      GI/Hepatic/Renal - negative ROS      Other findings: Per Dr. Cardoso's cardiac office visit of 6/22/17, pt's echo (6/15/17) has changed significantly from her prior one of 4/21/16. She now has mod-severe MR with severely dilated LA, MVP, mildly decreased RV function, and decreased EF of 48% with multiple hypokinetic segments of LV.  The recommendation is for a MVR.  With significant MR the EF may actually be lower than estimated, and the LV areas of hypokinesis are troubling, possibly CAD?  Pt's activity level is very limited, which also makes it difficult to assess her cardiac tolerance.    Cardiac clearance given - EM      Dental      Comment: Temporary bridge                Chemistry        Component Value Date/Time     05/05/2017 1023    K 4.3 05/26/2017 0642     05/05/2017 1023    CO2 24 05/05/2017 1023    BUN 16 05/05/2017 1023    CREATININE 0.64 05/26/2017 0642    GLU 90 05/05/2017 1023        Component Value Date/Time    CALCIUM 10.1 05/05/2017 1023    ALKPHOS 91 05/05/2017 1023    AST 16 05/05/2017 1023    ALT 88 (H) 05/05/2017 1023    BILITOT 0.6 05/05/2017 1023        Lab Results   Component Value Date    WBC 8.0 05/24/2017    HGB 12.1 06/30/2017    HCT 42.9 05/24/2017    MCV 92 05/24/2017     05/24/2017                Anesthesia Plan  Planned anesthetic: general endotracheal  Avoid vasoconstrictors, maintain volume, maintain heart rate  Amidate, sevo, ephedrine  ASA 4   Induction: intravenous   Anesthetic plan and  risks discussed with: patient    Post-op plan: routine recovery

## 2021-06-11 NOTE — ANESTHESIA CARE TRANSFER NOTE
Last vitals:   Vitals:    06/30/17 0830   BP: 149/69   Pulse: 77   Resp: 24   Temp:    SpO2: 100%   temp 36.6  Patient's level of consciousness is drowsy  Spontaneous respirations: yes  Maintains airway independently: yes  Dentition unchanged: yes  Oropharynx: oropharynx clear of all foreign objects    QCDR Measures:  ASA# 20 - Surgical Safety Checklist: ASA20A - Safety Checks Done  PQRS# 430 - Adult PONV Prevention: 4558F - Pt received => 2 anti-emetic agents (different classes) preop & intraop  ASA# 8 - Peds PONV Prevention: NA - Not pediatric patient, not GA or 2 or more risk factors NOT present  PQRS# 424 - Lynette-op Temp Management: 4559F - At least one body temp DOCUMENTED => 35.5C or 95.9F within required timeframe  PQRS# 426 - PACU Transfer Protocol: - Transfer of care checklist used  ASA# 14 - Acute Post-op Pain: ASA14B - Patient did NOT experience pain >= 7 out of 10

## 2021-06-11 NOTE — ANESTHESIA POSTPROCEDURE EVALUATION
Patient: Silvia Bagley  ENDOSCOPIC RETROGRADE CHOLANGIOPANCREATOGRAPHY  Anesthesia type: general    Patient location: PACU  Last vitals:   Vitals:    06/30/17 0900   BP: 135/63   Pulse: 65   Resp: 24   Temp:    SpO2: 98%     Post vital signs: stable  Level of consciousness: awake and responds to simple questions  Post-anesthesia pain: pain controlled  Post-anesthesia nausea and vomiting: no  Pulmonary: unassisted, return to baseline  Cardiovascular: stable and blood pressure at baseline  Hydration: adequate  Anesthetic events: no    QCDR Measures:  ASA# 11 - Lynette-op Cardiac Arrest: ASA11B - Patient did NOT experience unanticipated cardiac arrest  ASA# 12 - Lynette-op Mortality Rate: ASA12B - Patient did NOT die  ASA# 13 - PACU Re-Intubation Rate: ASA13B - Patient did NOT require a new airway mgmt  ASA# 10 - Composite Anes Safety: ASA10A - No serious adverse event  ASA# 38 - New Corneal Injury: ASA38A - No new exposure keratitis or corneal abrasion in PACU    Additional Notes:

## 2021-06-13 NOTE — TELEPHONE ENCOUNTER
----- Message from Tonnybreann Grijalva sent at 11/30/2020 11:24 AM CST -----  Regarding: [facility] PT / SYMPTOMS  General phone call:    Caller: Silvia Rowe     Primary cardiologist: CLL     Detailed reason for call: Pt states she's been experiencing skipping heart beat for the last 10 days, she's requesting for a call back to discuss any recommendations CLL might have.     New or active symptoms? Yes     Best phone number: [phone]    Best time to contact: Anytime     Ok to leave a detailed message? Yes     Device? No     Additional Info:

## 2021-06-13 NOTE — TELEPHONE ENCOUNTER
Response noted - echo sched on 12-22-20 - message sent to sched with instructions to contact patient to arrange f/u after echo.  mg

## 2021-06-13 NOTE — TELEPHONE ENCOUNTER
----- Message from Jeni Aquino sent at 12/9/2020 11:11 AM CST -----      Caller: patient  Primary cardiologist: Dr. Cardoso    Detailed reason for call: Patient is stating that the irregular heartbeats are keeping her up at night and she wanted to discuss that    Best phone number: [phone]  Best time to contact: today  Ok to leave a detailed message? yes  Device? No    Additional Info:

## 2021-06-13 NOTE — PATIENT INSTRUCTIONS - HE
Silvia,    It was a pleasure to see you today at [facility] [place].    Dr. Cardoso or her nurse will call you with the newly ordered test results.    Please call us if you have any questions or concerns about your heart.      Renny Us M.D.  [facility]

## 2021-06-13 NOTE — TELEPHONE ENCOUNTER
Return call to patient who reported that she has been experiencing increase in palpitations over past 10 days - occur on and off throughout the day and on most days - denied any associated sx of dizziness, dyspnea, CP and confirmed previous hx of palpitations - patient admits to increased stress lately due to holidays and covid.    Informed patient that message would be sent to  with request to call her and arrange echo previously recommended in June but postponed due to covid and that update would be forwarded to Dr. Cardoso for any new recommendations - understanding verbalized.    Please review patient update and plan to sched echo previously recommended - yrly f/u pending 6/2021 - any new orders?  mg

## 2021-06-13 NOTE — TELEPHONE ENCOUNTER
"Return call to patient who verbalized concerns that her palpitations persist and occur most nights when she goes to bed - episodes last 1-2 hrs and are not associated with dyspnea, dizziness, CP - patient worried that her echo is not scheduled until 12-22-20 and concerned that \"damage is occurring to her heart or something may happen\".    Informed patient that she could be seen in [facility] this week for evaluation in the absence of Dr. Cardoso and possibly move up echo appt - patient verbalized understanding and agreed with plan - call transf to samy haddad  "

## 2021-06-25 NOTE — PROGRESS NOTES
Progress Notes by Stacy Cardoso MD at 6/22/2017 11:10 AM     Author: Stacy Cardoso MD Service: -- Author Type: Physician    Filed: 6/22/2017  1:38 PM Encounter Date: 6/22/2017 Status: Signed    : Stacy Cardoso MD (Physician)           Click to link to [facility] HEART [facility] NOTE    Thank you, Dr. Marion, for asking us to see Silvia Finley at the [facility].      Assessment/Recommendations   Assessment:    1.  Valvular heart disease: Moderate to severe mitral regurgitation secondary to degenerative heart disease with posterior leaflet prolapse and now with reduction in her left ventricular systolic function.  She denies any symptoms however she is functionally limited due to her knee pain and recent surgery.  Due to the decrease in her left ventricular systolic function and moderate to severe mitral regurgitation I recommend proceeding with valve surgery with either repair of the valve versus replacement.  I discussed this at length with her and her  today.  I discussed that would recommend proceeding with a NASIR as well as an angiogram.  They would like to discuss further with the surgeon prior to proceeding with the studies.  I sent a referral to Dr. Keanu Tejada.  2.  Hypertension: Well controlled today.  Continue on current medications.  3.  May follow-up with me in a couple months.       History of Present Illness    Ms. Silvia Finley is a 76 y.o. female with long history of mitral regurgitation secondary to posterior leaflet prolapse who I am seeing today in follow-up.  She hurt her knee when she was in [place] in February.  She recently underwent knee surgery on 5/24/17.  She had been in extreme pain prior to that after her injury and was very immobile.  In April she suffered from pancreatitis and is following with [place] [facility] for this.  Her  believes that she needs to have another endoscopy for  follow-up.  She reports that she has not had any further abdominal discomfort since then.  She denies any problems with breathing, edema, palpitations or chest pain however she is very functionally limited due to her knee pain.  Currently undergoing physical therapy.  Her recent echocardiogram now shows reduced left ventricular systolic function to an EF of 48% with moderate to severe mitral regurgitation.  Her left atrial volume is severely increased as well.      Echocardiogram 6/15/17    Left ventricle ejection fraction is mildly decreased. The calculated left ventricular ejection fraction is 48%.    Mild hypokinetic segments: mid anterior, mid anteroseptal, mid anterolateral, apical anterior, apical septal, apical lateral and apex.    Right Ventricle: Normal size. The systolic function is mildly reduced. TAPSE is abnormal    Left Atrium: Left atrial volume is severely increased.    Moderate to severe regurgitation. The jet is anterior directed and is eccentric. There is mild prolapse of the posterior mitral leaflet.    When compared to the previous study dated 4/21/2016, significant changes noted.       Physical Examination Review of Systems   Vitals:    06/22/17 1118   BP: 120/70   Pulse: 68   Resp: 18     Body mass index is 24.22 kg/(m^2).  Wt Readings from Last 3 Encounters:   06/22/17 124 lb (56.2 kg)   06/15/17 127 lb (57.6 kg)   05/24/17 127 lb (57.6 kg)       General Appearance:   alert, no apparent distress   HEENT:  no scleral icterus; the mucous membranes are pink and moist                                  Neck: jugular venous pressure normal   Chest: the spine is straight and the chest is symmetric   Lungs:   respirations unlabored; the lungs are clear to auscultation   Cardiovascular:   regular rhythm with normal first and second heart sounds and III/VI holosystolic murmur heard at the apex. .   Abdomen:  no organomegaly, masses, bruits, or tenderness; bowel sounds are present   Extremities: no  cyanosis, clubbing, or edema   Skin: no xanthelasma    General: WNL  Eyes: WNL  Ears/Nose/Throat: WNL  Lungs: WNL  Heart: WNL  Stomach: WNL  Bladder: WNL  Muscle/Joints: WNL  Skin: WNL  Nervous System: WNL  Mental Health: WNL     Blood: WNL     Medical History  Surgical History Family History Social History   Past Medical History:   Diagnosis Date   ? Arthritis    ? Depression    ? Dyslipidemia    ? Epileptic seizures    ? History of transfusion    ? Hypertension    ? Mitral regurgitation    ? Palpitations    ? Postoperative anemia due to acute blood loss 5/26/2017   ? Seizures    ? Valvular heart disease     Past Surgical History:   Procedure Laterality Date   ? APPENDECTOMY     ? CHOLECYSTECTOMY     ? DILATION AND CURETTAGE OF UTERUS     ? ESOPHAGOGASTRODUODENOSCOPY N/A 5/12/2017    Procedure: ENDOSCOPIC ULTRASOUND;  Surgeon: Tomer Bob MD;  Location: [facility] [place] [place];  Service:    ? GALLBLADDER SURGERY     ? KNEE ARTHROSCOPY Right    ? IN TOTAL KNEE ARTHROPLASTY Right 5/24/2017    Procedure: RIGHT TOTAL KNEE ARTHROPLASTY;  Surgeon: Mukesh Palencia MD;  Location: [facility];  Service: Orthopedics   ? TONSILLECTOMY      Family History   Problem Relation Age of Onset   ? Heart disease Brother     Social History     Social History   ? Marital status:      Spouse name: N/A   ? Number of children: N/A   ? Years of education: N/A     Occupational History   ? Not on file.     Social History Main Topics   ? Smoking status: Never Smoker   ? Smokeless tobacco: Never Used   ? Alcohol use 0.6 oz/week     1 Glasses of wine per week      Comment: Occasional   ? Drug use: No   ? Sexual activity: Not on file     Other Topics Concern   ? Not on file     Social History Narrative          Medications  Allergies   Current Outpatient Prescriptions   Medication Sig Dispense Refill   ? aspirin 325 MG tablet Take 1 tablet (325 mg total) by mouth 2 (two) times a day. 60 tablet 0   ? cholecalciferol, vitamin D3,  (VITAMIN D3) 1,000 unit capsule Take 1,000 Units by mouth daily.     ? diltiazem (CARDIZEM CD) 360 MG 24 hr capsule Take 360 mg by mouth every evening.      ? levETIRAcetam (KEPPRA) 750 MG tablet Take 750 mg by mouth 2 (two) times a day.      ? losartan (COZAAR) 25 MG tablet Take 1 tablet (25 mg total) by mouth daily. 90 tablet 3   ? oxyCODONE (ROXICODONE) 5 MG immediate release tablet Take 5 mg by mouth every 4 (four) hours as needed for pain.     ? senna-docusate (PERICOLACE) 8.6-50 mg tablet Take 2 tablets by mouth 2 (two) times a day as needed for constipation. 40 tablet 0   ? sertraline (ZOLOFT) 50 MG tablet Take 50 mg by mouth daily.     ? simvastatin (ZOCOR) 20 MG tablet Take 20 mg by mouth bedtime.     ? acetaminophen (TYLENOL) 500 MG tablet Take 2 tablets (1,000 mg total) by mouth 3 (three) times a day. 30 tablet 0     No current facility-administered medications for this visit.       Allergies   Allergen Reactions   ? Cefuroxime Axetil Hives   ? Diph,Pertuss(Acel),Tet Vac(Pf) Hives   ? Penicillins Hives         Lab Results    Chemistry/lipid CBC Cardiac Enzymes/BNP/TSH/INR   Lab Results   Component Value Date    CHOL 160 04/17/2017    HDL 47 (L) 04/17/2017    LDLCALC 88 04/17/2017    TRIG 124 04/17/2017    CREATININE 0.64 05/26/2017    BUN 16 05/05/2017    K 4.3 05/26/2017     05/05/2017     05/05/2017    CO2 24 05/05/2017    Lab Results   Component Value Date    WBC 8.0 05/24/2017    HGB 11.5 (L) 05/26/2017    HCT 42.9 05/24/2017    MCV 92 05/24/2017     05/24/2017    Lab Results   Component Value Date    TSH 2.04 04/17/2017    INR 1.11 (H) 05/25/2017

## 2021-06-26 NOTE — PROGRESS NOTES
Progress Notes by Stacy Cardoso MD at 9/28/2018  7:50 AM     Author: Stacy Cardoso MD Service: -- Author Type: Physician    Filed: 9/28/2018  8:51 AM Encounter Date: 9/28/2018 Status: Signed    : Stacy Cardoso MD (Physician)           Click to link to [facility] HEART CARE NOTE    Thank you, Dr. Marion, for asking us to see Silvia Denney at the [facility].      Assessment/Recommendations   Assessment:    1.  Valvular heart disease: Moderate mitral regurgitation on her last assessment done at [facility].  Extensive workup including echo stress test, transesophageal echocardiogram and CT coronary angiogram.  She was found to have moderate mitral regurgitation with normal left ventricular systolic function and nonobstructive coronary artery disease.  Continued observation recommended at this time.  She is asymptomatic.  2.  Hypertension: Pressure elevated today however normally better controlled.  Recommend following up with a blood pressure check and no further changes at this time.  Follow-up in 6 months.       History of Present Illness    Ms. Silvia Denney is a 77 y.o. female with long history of mitral regurgitation secondary to posterior leaflet prolapse who I am seeing today in follow-up.    When I last saw her a year ago she underwent an echocardiogram that showed EF of 48% with moderate to severe mitral regurgitation.  I referred her to see a surgeon regarding mitral valve repair/replacement.  She underwent a NASIR that showed only moderate mitral regurgitation and CT coronary angiogram showed nonobstructive coronary artery disease.  Since that time she reports feeling well.  He denies any problems with chest pain or breathing difficulty.  Exercise tolerance is somewhat limited due to knee pain.  She underwent knee surgery last year.  She is planning on vacationing in [place] in February with her  for 6 weeks.       CT coronary angiogram 8/28/2017 [facility] [place]    1. Mild nonobstructive coronary artery disease.  a. Calcium score 95.2 (62nd percentile for age and gender).  b. No severe coronary stenosis.  2. Enlarged left atrium with normal visualized appendage.  a. 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  b. Tiny patent foramen ovale with tiny left to right contrast jet.  3. Aortic atherosclerosis.  4. Please see the separate radiology dictation for noncardiovascular   findings.        Rojas Antony MD  TFL/[place]   D: 8/28/2017 T: 8/28/2017        Echo stress test 1/2/2018  Final Impressions:     1. Maximum stress test with 110.4% of age predicted maximum heart rate   achieved.   2. During stress exam the patient developed no significant symptoms.   3. Post stress, normal left ventricular size, normal global systolic   function.   4. There were no ischemic changes by EKG during stress.   5. Negative stress echo for ischemia.   6. Mild mitral valve prolapse.   7. The mitral valve is sclerotic, moderate mitral regurgitation.   8. With exercise, there is no change in MR degree. Remains moderate. The   estimated PAP pressures only increase mildly to 36 mm Hg plus RA from 29   mm Hg plus RA.    Transesophageal echocardiogram  8/28/2017  Final Impressions:   1. Normal left ventricular size, normal global systolic function with an estimated EF of 65 - 70%.   2. 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   3. Focal prolapse without flail of P2 segment of posterior leaflet.   4. Negative bubble study.   5. No evidence of thrombus present in the left atrial appendage.   6. PFO present.     Physical Examination Review of Systems   Vitals:    09/28/18 0752   BP: 152/70   Pulse: 80   Resp: 18     Body mass index is 26.95 kg/(m^2).  Wt Readings from Last 3 Encounters:   09/28/18 138 lb (62.6 kg)   06/30/17 123 lb 2 oz  (55.8 kg)   06/22/17 124 lb (56.2 kg)       General Appearance:   alert, no apparent distress   HEENT:  no scleral icterus; the mucous membranes are pink and moist                                  Neck: jugular venous pressure normal   Chest: the spine is straight and the chest is symmetric   Lungs:   respirations unlabored; the lungs are clear to auscultation   Cardiovascular:   regular rhythm with normal first and second heart sounds and 3/6 systolic murmur heard at the apex   Abdomen:  no organomegaly, masses, bruits, or tenderness; bowel sounds are present   Extremities: no cyanosis, clubbing, or edema   Skin: no xanthelasma    General: WNL  Eyes: WNL  Ears/Nose/Throat: WNL  Lungs: WNL  Heart: WNL  Stomach: WNL  Bladder: WNL  Muscle/Joints: WNL  Skin: WNL  Nervous System: WNL  Mental Health: WNL     Blood: WNL     Medical History  Surgical History Family History Social History   Past Medical History:   Diagnosis Date   ? Arthritis    ? Depression    ? Dyslipidemia    ? Epileptic seizures (H)    ? History of transfusion     pt states many years ago   ? Hypertension    ? Mitral regurgitation    ? Palpitations    ? Pancreatitis 04/2017   ? Seizures (H)     last seizure activity Dec 2016   ? Valvular heart disease     Past Surgical History:   Procedure Laterality Date   ? APPENDECTOMY     ? CHOLECYSTECTOMY     ? DILATION AND CURETTAGE OF UTERUS     ? ERCP N/A 6/30/2017    Procedure: ENDOSCOPIC RETROGRADE CHOLANGIOPANCREATOGRAPHY;  Surgeon: Tomer Bob MD;  Location: [facility];  Service:    ? ESOPHAGOGASTRODUODENOSCOPY N/A 5/12/2017    Procedure: ENDOSCOPIC ULTRASOUND;  Surgeon: Tomer Bob MD;  Location: [facility] OR;  Service:    ? GALLBLADDER SURGERY     ? KNEE ARTHROSCOPY Right    ? MT TOTAL KNEE ARTHROPLASTY Right 5/24/2017    Procedure: RIGHT TOTAL KNEE ARTHROPLASTY;  Surgeon: Mukesh Palencia MD;  Location: [facility] OR;  Service: Orthopedics   ? TONSILLECTOMY      Family History    Problem Relation Age of Onset   ? Heart disease Brother     Social History     Social History   ? Marital status:      Spouse name: N/A   ? Number of children: N/A   ? Years of education: N/A     Occupational History   ? Not on file.     Social History Main Topics   ? Smoking status: Never Smoker   ? Smokeless tobacco: Never Used   ? Alcohol use Yes      Comment: rarely   ? Drug use: No   ? Sexual activity: Not on file     Other Topics Concern   ? Not on file     Social History Narrative          Medications  Allergies   Current Outpatient Prescriptions   Medication Sig Dispense Refill   ? aspirin 81 mg chewable tablet Chew 81 mg daily.     ? cholecalciferol, vitamin D3, (VITAMIN D3) 1,000 unit capsule Take 1,000 Units by mouth daily.     ? diltiazem (CARDIZEM CD) 360 MG 24 hr capsule Take 360 mg by mouth every evening.      ? levETIRAcetam (KEPPRA) 750 MG tablet Take 750 mg by mouth 2 (two) times a day.      ? losartan (COZAAR) 25 MG tablet Take 1 tablet (25 mg total) by mouth daily. 90 tablet 3   ? sertraline (ZOLOFT) 50 MG tablet Take 50 mg by mouth daily.     ? simvastatin (ZOCOR) 20 MG tablet Take 20 mg by mouth bedtime.     ? oxyCODONE (ROXICODONE) 5 MG immediate release tablet Take 5 mg by mouth every 4 (four) hours as needed for pain.     ? senna-docusate (PERICOLACE) 8.6-50 mg tablet Take 2 tablets by mouth 2 (two) times a day as needed for constipation. (Patient taking differently: Take 1 tablet by mouth 2 (two) times a day as needed for constipation. ) 40 tablet 0     No current facility-administered medications for this visit.       Allergies   Allergen Reactions   ? Diph,Pertuss(Acel),Tet Vac(Pf) Hives   ? Penicillins Hives   ? Cefuroxime Axetil Hives and Rash         Lab Results    Chemistry/lipid CBC Cardiac Enzymes/BNP/TSH/INR   Lab Results   Component Value Date    CHOL 160 04/17/2017    HDL 47 (L) 04/17/2017    LDLCALC 88 04/17/2017    TRIG 124 04/17/2017    CREATININE 0.64 05/26/2017     BUN 16 05/05/2017    K 4.3 05/26/2017     05/05/2017     05/05/2017    CO2 24 05/05/2017    Lab Results   Component Value Date    WBC 8.0 05/24/2017    HGB 12.1 06/30/2017    HCT 42.9 05/24/2017    MCV 92 05/24/2017     05/24/2017    Lab Results   Component Value Date    TSH 2.04 04/17/2017    INR 1.11 (H) 05/25/2017

## 2021-06-29 NOTE — PROGRESS NOTES
"Progress Notes by Stacy Cardoso MD at 6/16/2020 12:50 PM     Author: Stacy Cardoso MD Service: -- Author Type: Physician    Filed: 6/16/2020  2:00 PM Encounter Date: 6/16/2020 Status: Signed    : Stacy Cardoos MD (Physician)           The patient has been notified of following:     \"This telephone visit will be conducted via a call between you and your physician/provider. We have found that certain health care needs can be provided without the need for a physical exam.  This service lets us provide the care you need with a phone conversation.  If a prescription is necessary we can send it directly to your pharmacy.  If lab work is needed we can place an order for that and you can then stop by our lab to have the test done at a later time. If during the course of the call the physician/provider feels a telephone visit is not appropriate, you will not be charged for this service.\" Verbal consent has been obtained for this service by care team member:         HEART CARE PHONE ENCOUNTER        The patient has chosen to have the visit conducted as a telephone visit, to reduce risk of exposure given the current status of Coronavirus in our community. This telephone visit is being conducted via a call between the patient and physician/provider. Health care needs are being provided without a physical exam.     Assessment/Recommendations   Assessment:    1.  Valvular heart disease: Moderate mitral regurgitation with moderate mitral valve prolapse.  She is asymptomatic.  2.  Hypertension: blood pressure well controlled    Plan:  1. Echocardiogram which will be done post COVID      Follow Up Plan: Follow up in one year  I have reviewed the note as documented.  This accurately captures the substance of my conversation with the patient.    Total time of call between patient and provider was 16 minutes   Start Time:12:52p  Stop Time:1:08p       History of Present Illness/Subjective    Silvia BELLO " Олег is a 79 y.o. female who is being evaluated via a billable telephone visit.    She has history of moderate mitral valve regurgitation with moderate mitral valve prolapse.  She has been walking regularly with her  2 miles most days of the week.  She has not noted any problems of chest pain or breathing difficulty.  She denies any edema.  She does complain of knee pain and back pain.    Echocardiogram 6/18/19    Left ventricle ejection fraction is normal. The calculated left ventricular ejection fraction is 58%.    Normal left ventricular size and systolic function.    Normal right ventricular size and systolic function.    Left atrial volume is moderately increased.    Mitral Valve: Moderate mitral regurgitation. The jet is anterior directed and is eccentric. There is moderate prolapse of the posterior mitral leaflet.       I have reviewed and updated the patient's Past Medical History, Social History, Family History and Medication List.     Physical Examination not performed given phone encounter Review of Systems                                                Medical History  Surgical History Family History Social History   Past Medical History:   Diagnosis Date   ? Arthritis    ? Depression    ? Dyslipidemia    ? Epileptic seizures (H)    ? History of transfusion     pt states many years ago   ? Hypertension    ? Mitral regurgitation    ? Palpitations    ? Pancreatitis 04/2017   ? Seizures (H)     last seizure activity Dec 2016   ? Valvular heart disease     Past Surgical History:   Procedure Laterality Date   ? APPENDECTOMY     ? CHOLECYSTECTOMY     ? DILATION AND CURETTAGE OF UTERUS     ? ERCP N/A 6/30/2017    Procedure: ENDOSCOPIC RETROGRADE CHOLANGIOPANCREATOGRAPHY;  Surgeon: Tomer Bob MD;  [place] [facility] [place]  Service:    ? ESOPHAGOGASTRODUODENOSCOPY N/A 5/12/2017    Procedure: ENDOSCOPIC ULTRASOUND;  Surgeon: Tomer Bob MD;  Location: [facility] OR;  Service:    ?  GALLBLADDER SURGERY     ? KNEE ARTHROSCOPY Right    ? ME TOTAL KNEE ARTHROPLASTY Right 5/24/2017    Procedure: RIGHT TOTAL KNEE ARTHROPLASTY;  Surgeon: Mukesh Palencia MD;  Location: [facility] OR;  Service: Orthopedics   ? TONSILLECTOMY      Family History   Problem Relation Age of Onset   ? Heart disease Brother    ? Breast cancer Neg Hx     Social History     Socioeconomic History   ? Marital status:      Spouse name: Not on file   ? Number of children: Not on file   ? Years of education: Not on file   ? Highest education level: Not on file   Occupational History   ? Not on file   Social Needs   ? Financial resource strain: Not on file   ? Food insecurity     Worry: Not on file     Inability: Not on file   ? Transportation needs     Medical: Not on file     Non-medical: Not on file   Tobacco Use   ? Smoking status: Never Smoker   ? Smokeless tobacco: Never Used   Substance and Sexual Activity   ? Alcohol use: Yes     Comment: rarely   ? Drug use: No   ? Sexual activity: Not on file   Lifestyle   ? Physical activity     Days per week: Not on file     Minutes per session: Not on file   ? Stress: Not on file   Relationships   ? Social connections     Talks on phone: Not on file     Gets together: Not on file     Attends Denominational service: Not on file     Active member of club or organization: Not on file     Attends meetings of clubs or organizations: Not on file     Relationship status: Not on file   ? Intimate partner violence     Fear of current or ex partner: Not on file     Emotionally abused: Not on file     Physically abused: Not on file     Forced sexual activity: Not on file   Other Topics Concern   ? Not on file   Social History Narrative   ? Not on file          Medications  Allergies   Current Outpatient Medications   Medication Sig Dispense Refill   ? aspirin 81 mg chewable tablet Chew 81 mg daily.     ? cholecalciferol, vitamin D3, (VITAMIN D3) 1,000 unit capsule Take 1,000 Units by mouth  daily.     ? diltiazem (CARDIZEM CD) 360 MG 24 hr capsule Take 360 mg by mouth every evening.      ? levETIRAcetam (KEPPRA) 750 MG tablet Take 750 mg by mouth 2 (two) times a day.      ? losartan (COZAAR) 25 MG tablet Take 1 tablet (25 mg total) by mouth daily. (Patient taking differently: Take 50 mg by mouth daily. ) 90 tablet 3   ? sertraline (ZOLOFT) 50 MG tablet Take 50 mg by mouth daily.     ? simvastatin (ZOCOR) 20 MG tablet Take 20 mg by mouth bedtime.     ? oxyCODONE (ROXICODONE) 5 MG immediate release tablet Take 5 mg by mouth every 4 (four) hours as needed for pain.     ? senna-docusate (PERICOLACE) 8.6-50 mg tablet Take 2 tablets by mouth 2 (two) times a day as needed for constipation. (Patient taking differently: Take 1 tablet by mouth 2 (two) times a day as needed for constipation. ) 40 tablet 0     No current facility-administered medications for this visit.     Allergies   Allergen Reactions   ? Diph,Pertuss(Acel),Tet Vac(Pf) Hives   ? Penicillins Hives   ? Cefuroxime Axetil Hives and Rash         Lab Results    Chemistry/lipid CBC Cardiac Enzymes/BNP/TSH/INR   Lab Results   Component Value Date    CHOL 189 12/11/2019    HDL 48 (L) 12/11/2019    LDLCALC 117 12/11/2019    TRIG 122 12/11/2019    CREATININE 0.68 12/11/2019    BUN 18 12/11/2019    K 3.5 12/11/2019     12/11/2019     12/11/2019    CO2 26 12/11/2019    Lab Results   Component Value Date    WBC 7.3 12/20/2018    HGB 14.3 12/20/2018    HCT 44.8 12/20/2018    MCV 93 12/20/2018     12/20/2018    Lab Results   Component Value Date    TSH 2.38 12/11/2019    INR 1.11 (H) 05/25/2017        Stacy Cardoso

## 2021-06-30 NOTE — PROGRESS NOTES
Progress Notes by Ameya Us MD at 12/10/2020  1:50 PM     Author: Ameya Us MD Service: -- Author Type: Physician    Filed: 12/10/2020  2:30 PM Encounter Date: 12/10/2020 Status: Signed    : Ameya Us MD (Physician)            [facility] Note    Silvia Denney was advised by Dr. Stacy Cardoso to meet with me today at the [facility] to evaluate palpitations.     Assessment:    1. Palpitations (Symptom)  Holter Monitor   2. PVC's (premature ventricular contractions)  Holter Monitor    Magnesium       Plan:    1. She is already scheduled for an echocardiogram per Dr. Stacy Cardoso and I will forward these other test results to Dr. Cardoso so that she and her nursing team can provide advice to Silvia.  I will defer to Dr. Stacy Cardoso as to whether or not Silvia would benefit from stress testing.  In the meantime, I counseled Silvia that premature ventricular beats have a probably benign prognosis for her.  I made no changes in her medications today.    An After Visit Summary was printed and given to the patient.    Primary Cardiologist:  Dr. Stacy Cardoso    Current History:    Silvia states she has had infrequent palpitations for many years but for the last 3 to 4 weeks they have been more frequent.  That is, she notices them most every day and instead of just being 1 or 2 skipped heartbeats she may have many irregular beats in the course of an hour.  She is particularly troubled by these when she lays down to sleep at night.  She does not experience lightheadedness, syncope, orthopnea, unusual dyspnea, angina, or lower extremity edema.    Silvia tells me that she walks for 30 to 35 minutes at least 3 days a week with her .    She believes her older brother  of heart disease but she has no information about what kind of heart disease he suffered from.  He passed away at home and did not have an autopsy.   Her parents were both very long lived.    Patient Active Problem List   Diagnosis   ? Palpitations (Symptom)   ? Other hyperlipidemia   ? Essential hypertension   ? Moderate mitral regurgitation   ? LFT elevation   ? Seizure disorder (H)   ? Depression       Past Medical History:  Past Medical History:   Diagnosis Date   ? Arthritis    ? Depression    ? Dyslipidemia    ? Epileptic seizures (H)    ? Essential hypertension    ? History of transfusion     pt states many years ago   ? Moderate mitral regurgitation 2012   ? Pancreatitis 2017   ? Postoperative anemia due to acute blood loss 2017   ? Seizures (H)     last seizure activity Dec 2016   ? Status post right knee replacement 2017       Past Surgical History:  Past Surgical History:   Procedure Laterality Date   ? APPENDECTOMY     ? CHOLECYSTECTOMY     ? DILATION AND CURETTAGE OF UTERUS     ? ERCP N/A 2017    Procedure: ENDOSCOPIC RETROGRADE CHOLANGIOPANCREATOGRAPHY;  Surgeon: Tomer Bob MD;  Location: [facility] OR;  Service:    ? ESOPHAGOGASTRODUODENOSCOPY N/A 2017    Procedure: ENDOSCOPIC ULTRASOUND;  Surgeon: Tomer Bob MD;  Location: [facility] OR;  Service:    ? GALLBLADDER SURGERY     ? KNEE ARTHROSCOPY Right    ? [place] TOTAL KNEE ARTHROPLASTY Right 2017    Procedure: RIGHT TOTAL KNEE ARTHROPLASTY;  Surgeon: Mukesh Palencia MD;  Location: [facility] OR;  Service: Orthopedics   ? TONSILLECTOMY         Family History:  Family History   Problem Relation Age of Onset   ? Heart disease Brother 87        he  at home, no autopsy   ? Colon cancer Brother    ? Hypertension Mother    ? Hypertension Father    ? No Medical Problems Son    ? Suicidality Son 31        cause of death   ? No Medical Problems Son    ? No Medical Problems Daughter    ? Breast cancer Neg Hx        Social History:   reports that she has never smoked. She has never used smokeless tobacco. She reports current alcohol use. She reports  that she does not use drugs.  Social History     Socioeconomic History   ? Marital status:      Spouse name: Not on file   ? Number of children: 4   ? Years of education: Not on file   ? Highest education level: Not on file   Occupational History   ? Occupation: stay at home mother   Social Needs   ? Financial resource strain: Not on file   ? Food insecurity     Worry: Not on file     Inability: Not on file   ? Transportation needs     Medical: Not on file     Non-medical: Not on file   Tobacco Use   ? Smoking status: Never Smoker   ? Smokeless tobacco: Never Used   Substance and Sexual Activity   ? Alcohol use: Yes     Comment: rarely   ? Drug use: No   ? Sexual activity: Not on file   Lifestyle   ? Physical activity     Days per week: 3 days     Minutes per session: 30 min   ? Stress: Not on file   Relationships   ? Social connections     Talks on phone: Not on file     Gets together: Not on file     Attends Voodoo service: Not on file     Active member of club or organization: Not on file     Attends meetings of clubs or organizations: Not on file     Relationship status: Not on file   ? Intimate partner violence     Fear of current or ex partner: Not on file     Emotionally abused: Not on file     Physically abused: Not on file     Forced sexual activity: Not on file   Other Topics Concern   ? Not on file   Social History Narrative    She walks for 30-35 minutes a week.       Medications:  Outpatient Encounter Medications as of 12/10/2020   Medication Sig Dispense Refill   ? aspirin 81 mg chewable tablet Chew 81 mg daily.     ? cholecalciferol, vitamin D3, (VITAMIN D3) 1,000 unit capsule Take 1,000 Units by mouth daily.     ? diltiazem (CARDIZEM CD) 360 MG 24 hr capsule Take 360 mg by mouth every evening.      ? levETIRAcetam (KEPPRA) 750 MG tablet Take 750 mg by mouth 2 (two) times a day.      ? losartan (COZAAR) 25 MG tablet Take 1 tablet (25 mg total) by mouth daily. (Patient taking differently:  Take 50 mg by mouth daily. ) 90 tablet 3   ? sertraline (ZOLOFT) 50 MG tablet Take 50 mg by mouth daily.     ? simvastatin (ZOCOR) 20 MG tablet Take 20 mg by mouth bedtime.     ? [DISCONTINUED] oxyCODONE (ROXICODONE) 5 MG immediate release tablet Take 5 mg by mouth every 4 (four) hours as needed for pain.     ? [DISCONTINUED] senna-docusate (PERICOLACE) 8.6-50 mg tablet Take 2 tablets by mouth 2 (two) times a day as needed for constipation. (Patient taking differently: Take 1 tablet by mouth 2 (two) times a day as needed for constipation. ) 40 tablet 0     No facility-administered encounter medications on file as of 12/10/2020.        Allergies  Diph,pertuss(acel),tet vac(pf); Penicillins; and Cefuroxime axetil    Review of Systems    General: Night Sweats(Hot flashes)  Eyes: WNL  Ears/Nose/Throat: WNL  Lungs: WNL  Heart: Irregular Heartbeat  Stomach: WNL  Bladder: Frequent Urination at Night  Muscle/Joints: WNL  Skin: WNL  Nervous System: WNL  Mental Health: WNL     Blood: WNL    Objective:    Wt Readings from Last 5 Encounters:   12/10/20 134 lb (60.8 kg)   06/18/19 137 lb (62.1 kg)   06/05/19 136 lb (61.7 kg)   09/28/18 138 lb (62.6 kg)   06/30/17 123 lb 2 oz (55.8 kg)         Body mass index is 26.17 kg/m .  /76 (Patient Site: Left Arm, Patient Position: Sitting, Cuff Size: Adult Regular)   Pulse 95   Resp 16   Wt 134 lb (60.8 kg)   SpO2 97%   BMI 26.17 kg/m       Physical Exam:    General Appearance: Alert and not in distress   HEENT: No scleral icterus; the mucous membranes are pink and moist   Neck: No cervical bruits, adenopathy, or thyromegaly; jugular venous pressure is less than 5 cm   Chest: The chest is symmetric   Lungs: Respirations are unlabored; the lungs are clear to auscultation   Cardiovasular: Auscultation reveals normal first and second heart sounds with a grade 1/6 holosystolic murmur at the apex   Extremities: No cyanosis, clubbing or edema   Skin: No xanthelasma   Neurologic:  Normal gait and coordination   Psychiatric: Mood and affect are normal       Cardiac testing:    EKG: Sinus rhythm with a single premature ventricular beat with evidence of left ventricular hypertrophy with secondary ST and T wave changes per my personal review of the EKG performed December 2, 2020 at her primary care clinic.    Echocardiogram:   Results for orders placed in visit on 06/05/19   Echo Complete [ECH10] 06/18/2019    Narrative   Left ventricle ejection fraction is normal. The calculated left   ventricular ejection fraction is 58%.    Normal left ventricular size and systolic function.    Normal right ventricular size and systolic function.    Left atrial volume is moderately increased.    Mitral Valve: Moderate mitral regurgitation. The jet is anterior   directed and is eccentric. There is moderate prolapse of the posterior   mitral leaflet.          Imaging:    No results found.    Lab Review:    Lab Results   Component Value Date     10/26/2020    K 3.8 10/26/2020     10/26/2020    CO2 23 10/26/2020    BUN 18 10/26/2020    CREATININE 0.73 10/26/2020    CALCIUM 10.0 10/26/2020     Lab Results   Component Value Date    WBC 7.3 12/20/2018    HGB 14.3 12/20/2018    HCT 44.8 12/20/2018    MCV 93 12/20/2018     12/20/2018     Lab Results   Component Value Date    CHOL 171 10/26/2020    TRIG 158 (H) 10/26/2020    HDL 50 10/26/2020     LDL Calculated (mg/dL)   Date Value   10/26/2020 89   12/11/2019 117   12/20/2018 95           Much or all of the text in this note was generated through the use of the Dragon Dictate voice-to-text software. Errors in spelling or words which seem out of context are unintentional. Sound alike errors, in particular, may have escaped editing.

## 2021-06-30 NOTE — PROGRESS NOTES
Progress Notes by Stacy Cardoso MD at 1/11/2021  8:50 AM     Author: Stacy Cardoso MD Service: -- Author Type: Physician    Filed: 1/11/2021 10:16 AM Encounter Date: 1/11/2021 Status: Signed    : Stacy Cardoso MD (Physician)           Thank you, Dr. Marion, for asking us to see Silvia Denney at the [facility].      Assessment/Recommendations   Assessment:    1.  Valvular heart disease: Moderate to severe mitral regurgitation with prolapse increased mildly compared to last echocardiogram.  Asymptomatic and she is actually noted improvement in her symptoms due to increased exercise over the Covid pandemic.  We will continue to monitor.  2.  Symptomatic premature ventricular contractions  3.  Hypertension: Well-controlled      Plan:  1.  Repeat echocardiogram in 1 year  2.  Discussed medical therapy for PVCs however given low burden will not make any changes at this time.  3.  Follow-up in 1 year       History of Present Illness    Ms. Silvia Denney is a 80 y.o. female with history of moderate to severe mitral regurgitation with mitral valve prolapse who I am seeing today in follow-up due to concern with palpitations.  She underwent a Holter monitor that showed infrequent symptomatic PVCs.  Chuckey of 0.7%.  She denies any chest pain or breathing difficulty.  No edema.  She is actually been more active during the Covid pandemic.  She has been walking and exercising regularly with her  and has noted improvement in her exercise tolerance.      HOLTER MONITOR REPORT:     Ordering provider: Dr. Humphrey  Indication: Evaluate for palpitations, PVCs  Date of monitor 24-hour Holter monitor was applied December 14, 2020  Date of interpretation December1 5, 2020        Interpretation:     1. Baseline rhythm: Normal sinus rhythm with normal echocardiographic intervals; average heart rate 76 bpm with heart rate ranging between  bpm.       2. Longest  pause: No pauses present.      3. QRS morphology: Normal QRS duration     4. Supraventricular ectopy:   26 PACs MI: No atrial tachycardia or atrial fibrillation .      5. Ventricular ectopy:            791 PVC,  burden 0.7% %, PVCs are all singular with no repetitive forms or complexity     6. Atrial fibrillation/flutter:      None     7. Symptomatic transmissions:  8. 3 symptomatic transmissions recorded, patient noted palpitations  9. This seemed associated with singular PVCs     Conclusion:       Infrequent noncomplex ventricular ectopy sometimes perceived as palpitations  Otherwise normal Holter monitor     Echocardiogram 12/14/2020    1.Left ventricle ejection fraction is normal. The calculated left ventricular ejection fraction is 57%.    2.TAPSE is normal, which is consistent with normal right ventricular systolic function.    3.Moderate left atrial chamber enlargement.    4.Non-specific thickening mitral valve. Moderate to severe mitral regurgitation, ERO is 0.3 cm . The jet impinges on the atrial wall and is eccentric. There is mild prolapse of the posterior mitral leaflet.    5.When compared to the previous study dated 6/18/2019, mitral regurgitation appears to be closer to severe, ERO was 0.17 cm  and as above is now 0.3 cm .     Physical Examination Review of Systems   Vitals:    01/11/21 0901   BP: 128/82   Pulse: 82   Resp: 16   SpO2: 97%     Body mass index is 26.37 kg/m .  Wt Readings from Last 3 Encounters:   01/11/21 135 lb (61.2 kg)   12/14/20 134 lb (60.8 kg)   12/10/20 134 lb (60.8 kg)       General Appearance:   alert, no apparent distress   HEENT:  no scleral icterus; the mucous membranes are pink and moist                                  Neck: No jvd   Chest: the spine is straight and the chest is symmetric   Lungs:   respirations unlabored; the lungs are clear to auscultation   Cardiovascular:   regular rhythm with normal first and second heart sounds and systolic murmur   Abdomen:  no  organomegaly, masses, bruits, or tenderness; bowel sounds are present   Extremities: no cyanosis, clubbing, or edema   Skin: no xanthelasma    General: WNL  Eyes: WNL  Ears/Nose/Throat: WNL  Lungs: WNL  Heart: Irregular Heartbeat  Stomach: WNL  Bladder: Frequent Urination at Night  Muscle/Joints: WNL  Skin: WNL  Nervous System: WNL  Mental Health: Anxiety     Blood: WNL     Medical History  Surgical History Family History Social History   Past Medical History:   Diagnosis Date   ? Arthritis    ? Depression    ? Dyslipidemia    ? Epileptic seizures (H)    ? Essential hypertension    ? History of transfusion     pt states many years ago   ? Moderate mitral regurgitation 2012   ? Pancreatitis 2017   ? Postoperative anemia due to acute blood loss 2017   ? Seizures (H)     last seizure activity Dec 2016   ? Status post right knee replacement 2017    Past Surgical History:   Procedure Laterality Date   ? APPENDECTOMY     ? CHOLECYSTECTOMY     ? DILATION AND CURETTAGE OF UTERUS     ? ERCP N/A 2017    Procedure: ENDOSCOPIC RETROGRADE CHOLANGIOPANCREATOGRAPHY;  Surgeon: Tomer Bob MD;  Location: [facility] OR;  Service:    ? ESOPHAGOGASTRODUODENOSCOPY N/A 2017    Procedure: ENDOSCOPIC ULTRASOUND;  Surgeon: Tomer Bob MD;  Location: [facility] OR;  Service:    ? GALLBLADDER SURGERY     ? KNEE ARTHROSCOPY Right    ? VA TOTAL KNEE ARTHROPLASTY Right 2017    Procedure: RIGHT TOTAL KNEE ARTHROPLASTY;  Surgeon: Mukesh Palencia MD;  Location: [facility];  Service: Orthopedics   ? TONSILLECTOMY      Family History   Problem Relation Age of Onset   ? Heart disease Brother 87        he  at home, no autopsy   ? Colon cancer Brother    ? Hypertension Mother    ? Hypertension Father    ? No Medical Problems Son    ? Suicidality Son 31        cause of death   ? No Medical Problems Son    ? No Medical Problems Daughter    ? Breast cancer Neg Hx     Social History      Socioeconomic History   ? Marital status:      Spouse name: Not on file   ? Number of children: 4   ? Years of education: Not on file   ? Highest education level: Not on file   Occupational History   ? Occupation: stay at home mother   Social Needs   ? Financial resource strain: Not on file   ? Food insecurity     Worry: Not on file     Inability: Not on file   ? Transportation needs     Medical: Not on file     Non-medical: Not on file   Tobacco Use   ? Smoking status: Never Smoker   ? Smokeless tobacco: Never Used   Substance and Sexual Activity   ? Alcohol use: Yes     Comment: rarely   ? Drug use: No   ? Sexual activity: Not on file   Lifestyle   ? Physical activity     Days per week: 3 days     Minutes per session: 30 min   ? Stress: Not on file   Relationships   ? Social connections     Talks on phone: Not on file     Gets together: Not on file     Attends L[employer]-day service: Not on file     Active member of club or organization: Not on file     Attends meetings of clubs or organizations: Not on file     Relationship status: Not on file   ? Intimate partner violence     Fear of current or ex partner: Not on file     Emotionally abused: Not on file     Physically abused: Not on file     Forced sexual activity: Not on file   Other Topics Concern   ? Not on file   Social History Narrative    She walks for 30-35 minutes a week.          Medications  Allergies   Current Outpatient Medications   Medication Sig Dispense Refill   ? aspirin 81 mg chewable tablet Chew 81 mg daily.     ? cholecalciferol, vitamin D3, (VITAMIN D3) 1,000 unit capsule Take 1,000 Units by mouth daily.     ? diltiazem (CARDIZEM CD) 360 MG 24 hr capsule Take 360 mg by mouth every evening.      ? levETIRAcetam (KEPPRA) 750 MG tablet Take 750 mg by mouth 2 (two) times a day.      ? losartan (COZAAR) 25 MG tablet Take 1 tablet (25 mg total) by mouth daily. (Patient taking differently: Take 50 mg by mouth daily. ) 90 tablet 3   ?  sertraline (ZOLOFT) 50 MG tablet Take 50 mg by mouth daily.     ? simvastatin (ZOCOR) 20 MG tablet Take 20 mg by mouth bedtime.       No current facility-administered medications for this visit.       Allergies   Allergen Reactions   ? Diph,Pertuss(Acel),Tet Vac(Pf) Hives   ? Penicillins Hives   ? Cefuroxime Axetil Hives and Rash         Lab Results    Chemistry/lipid CBC Cardiac Enzymes/BNP/TSH/INR   Lab Results   Component Value Date    CHOL 171 10/26/2020    HDL 50 10/26/2020    LDLCALC 89 10/26/2020    TRIG 158 (H) 10/26/2020    CREATININE 0.73 10/26/2020    BUN 18 10/26/2020    K 3.8 10/26/2020     10/26/2020     10/26/2020    CO2 23 10/26/2020    Lab Results   Component Value Date    WBC 7.3 12/20/2018    HGB 14.3 12/20/2018    HCT 44.8 12/20/2018    MCV 93 12/20/2018     12/20/2018    Lab Results   Component Value Date    TSH 1.93 10/26/2020    INR 1.11 (H) 05/25/2017

## 2021-07-03 NOTE — ANESTHESIA PREPROCEDURE EVALUATION
Anesthesia Preprocedure Evaluation by Adolfo Vasquez MD at 5/23/2017  6:18 PM     Author: Adolfo Vasquez MD Service: -- Author Type: Physician    Filed: 5/24/2017  7:02 AM Date of Service: 5/23/2017  6:18 PM Status: Addendum    : Adolfo Vasquez MD (Physician)    Related Notes: Original Note by Adolfo Vasquez MD (Physician) filed at 5/23/2017  6:20 PM       Anesthesia Evaluation      Patient summary reviewed   No history of anesthetic complications     Airway   Mallampati: II  Neck ROM: full   Pulmonary - negative ROS and normal exam    breath sounds clear to auscultation                         Cardiovascular   Exercise tolerance: > or = 4 METS  (+) hypertension well controlled, valvular problems/murmurs MR and MVP, , hypercholesterolemia,     ECG reviewed  Rhythm: regular  Rate: normal,         Neuro/Psych    (+) seizures (Took Keppra this am), depression,     Endo/Other    (+) arthritis,      GI/Hepatic/Renal - negative ROS      Other findings: Echo 4/21/16   Summary   1. Normal left ventricular size and systolic performance. The ejection   fraction is estimated to be 60-65%.   2. There is mild prolapse of the posterior mitral valve leaflet. There is   moderate mitral regurgitation (anteriorly directed jet).   3. The left atrium is mildly enlarged.      Dental                             Anesthesia Plan  Planned anesthetic: spinal  Right adductor canal block and selective tibial nerve block for post operative pain  ASA 2     Anesthetic plan and risks discussed with: patient    Post-op plan: routine recovery

## 2021-07-13 ENCOUNTER — RECORDS - HEALTHEAST (OUTPATIENT)
Dept: ADMINISTRATIVE | Facility: CLINIC | Age: 81
End: 2021-07-13

## 2021-07-21 ENCOUNTER — RECORDS - HEALTHEAST (OUTPATIENT)
Dept: ADMINISTRATIVE | Facility: CLINIC | Age: 81
End: 2021-07-21

## 2021-10-23 ENCOUNTER — HEALTH MAINTENANCE LETTER (OUTPATIENT)
Age: 81
End: 2021-10-23

## 2021-11-02 DIAGNOSIS — G40.909 SEIZURE DISORDER (H): ICD-10-CM

## 2021-11-02 RX ORDER — LEVETIRACETAM 750 MG/1
750 TABLET ORAL 2 TIMES DAILY
Qty: 60 TABLET | Refills: 0 | Status: SHIPPED | OUTPATIENT
Start: 2021-11-02 | End: 2021-12-20

## 2021-11-02 NOTE — LETTER
11/2/2021        RE: Silvia BELLO Олег  [address] [facility] [address]                Dear Silvia,        We recently provided you with medication refills.  Many medications require routine follow-up with your doctor.    Your prescription(s) have been refilled for 30 days so you may have time for the above noted follow-up. Please call to schedule soon so we can assure you have an appointment before your next refills are needed. If you have already made a follow up appointment, please disregard this letter.     Dr. Louise is no longer at our clinic. Please call to schedule an appointment with another provider for continuing care and medication management.      Sincerely,        [facility]     (Formerly known as [facility])

## 2021-11-02 NOTE — TELEPHONE ENCOUNTER
Refill request for levetiracetam.  Previous pt of Dr. Louise.  Due for appt. Letter mailed to pt to remind to schedule with another provider as Dr. Louise is no longer here.  Medication T'd for review and signature    Virginia Alvarado LPN on 11/2/2021 at 3:54 PM

## 2021-11-15 ENCOUNTER — LAB REQUISITION (OUTPATIENT)
Dept: LAB | Facility: CLINIC | Age: 81
End: 2021-11-15

## 2021-11-15 DIAGNOSIS — E78.5 HYPERLIPIDEMIA, UNSPECIFIED: ICD-10-CM

## 2021-11-15 DIAGNOSIS — I10 ESSENTIAL (PRIMARY) HYPERTENSION: ICD-10-CM

## 2021-11-15 DIAGNOSIS — E55.9 VITAMIN D DEFICIENCY, UNSPECIFIED: ICD-10-CM

## 2021-11-15 LAB
ALBUMIN SERPL-MCNC: 4.2 G/DL (ref 3.5–5)
ALP SERPL-CCNC: 75 U/L (ref 45–120)
ALT SERPL W P-5'-P-CCNC: 16 U/L (ref 0–45)
ANION GAP SERPL CALCULATED.3IONS-SCNC: 11 MMOL/L (ref 5–18)
AST SERPL W P-5'-P-CCNC: 15 U/L (ref 0–40)
BASOPHILS # BLD AUTO: 0.1 10E3/UL (ref 0–0.2)
BASOPHILS NFR BLD AUTO: 1 %
BILIRUB SERPL-MCNC: 0.5 MG/DL (ref 0–1)
BUN SERPL-MCNC: 17 MG/DL (ref 8–28)
CALCIUM SERPL-MCNC: 10.1 MG/DL (ref 8.5–10.5)
CHLORIDE BLD-SCNC: 109 MMOL/L (ref 98–107)
CHOLEST SERPL-MCNC: 159 MG/DL
CO2 SERPL-SCNC: 23 MMOL/L (ref 22–31)
CREAT SERPL-MCNC: 0.76 MG/DL (ref 0.6–1.1)
EOSINOPHIL # BLD AUTO: 0.3 10E3/UL (ref 0–0.7)
EOSINOPHIL NFR BLD AUTO: 4 %
ERYTHROCYTE [DISTWIDTH] IN BLOOD BY AUTOMATED COUNT: 12.1 % (ref 10–15)
GFR SERPL CREATININE-BSD FRML MDRD: 75 ML/MIN/1.73M2
GLUCOSE BLD-MCNC: 100 MG/DL (ref 70–125)
HCT VFR BLD AUTO: 43.8 % (ref 35–47)
HDLC SERPL-MCNC: 56 MG/DL
HGB BLD-MCNC: 14.4 G/DL (ref 11.7–15.7)
IMM GRANULOCYTES # BLD: 0 10E3/UL
IMM GRANULOCYTES NFR BLD: 0 %
LDLC SERPL CALC-MCNC: 79 MG/DL
LYMPHOCYTES # BLD AUTO: 1.9 10E3/UL (ref 0.8–5.3)
LYMPHOCYTES NFR BLD AUTO: 27 %
MCH RBC QN AUTO: 30.2 PG (ref 26.5–33)
MCHC RBC AUTO-ENTMCNC: 32.9 G/DL (ref 31.5–36.5)
MCV RBC AUTO: 92 FL (ref 78–100)
MONOCYTES # BLD AUTO: 0.4 10E3/UL (ref 0–1.3)
MONOCYTES NFR BLD AUTO: 6 %
NEUTROPHILS # BLD AUTO: 4.3 10E3/UL (ref 1.6–8.3)
NEUTROPHILS NFR BLD AUTO: 62 %
NRBC # BLD AUTO: 0 10E3/UL
NRBC BLD AUTO-RTO: 0 /100
PLATELET # BLD AUTO: 279 10E3/UL (ref 150–450)
POTASSIUM BLD-SCNC: 4.2 MMOL/L (ref 3.5–5)
PROT SERPL-MCNC: 6.6 G/DL (ref 6–8)
RBC # BLD AUTO: 4.77 10E6/UL (ref 3.8–5.2)
SODIUM SERPL-SCNC: 143 MMOL/L (ref 136–145)
TRIGL SERPL-MCNC: 121 MG/DL
TSH SERPL DL<=0.005 MIU/L-ACNC: 1.88 UIU/ML (ref 0.3–5)
WBC # BLD AUTO: 6.9 10E3/UL (ref 4–11)

## 2021-11-15 PROCEDURE — 84443 ASSAY THYROID STIM HORMONE: CPT | Performed by: FAMILY MEDICINE

## 2021-11-15 PROCEDURE — 82306 VITAMIN D 25 HYDROXY: CPT | Performed by: FAMILY MEDICINE

## 2021-11-15 PROCEDURE — 82040 ASSAY OF SERUM ALBUMIN: CPT | Performed by: FAMILY MEDICINE

## 2021-11-15 PROCEDURE — 85025 COMPLETE CBC W/AUTO DIFF WBC: CPT | Performed by: FAMILY MEDICINE

## 2021-11-15 PROCEDURE — 80061 LIPID PANEL: CPT | Performed by: FAMILY MEDICINE

## 2021-11-16 LAB — DEPRECATED CALCIDIOL+CALCIFEROL SERPL-MC: 66 UG/L (ref 30–80)

## 2021-12-18 DIAGNOSIS — G40.909 SEIZURE DISORDER (H): ICD-10-CM

## 2021-12-18 NOTE — LETTER
12/18/2021        RE: Silvia BELLO Juanito  [address] [place] [address]      Dear Silvia,     We recently provided you with medication refills.  Many medications require routine follow-up with your doctor. Unfortunately, Dr. Louise is no longer with our clinic, so you will need to establish care with another one of the providers here.     Your prescription(s) have been refilled for 30 days so you may have time for the above noted follow-up. Please call to schedule soon so we can assure you have an appointment before your next refills are needed. If you have already made a follow up appointment, please disregard this letter.         Sincerely,      [facility]     (Formerly known as [facility])

## 2021-12-20 RX ORDER — LEVETIRACETAM 750 MG/1
TABLET ORAL
Qty: 60 TABLET | Refills: 0 | Status: SHIPPED | OUTPATIENT
Start: 2021-12-20 | End: 2022-01-18

## 2021-12-20 NOTE — TELEPHONE ENCOUNTER
Refill request for levetiracetam. Pt has follow up appointment scheduled. Will refill script.     Janis García RN on 12/20/2021 at 9:29 AM

## 2022-01-18 ENCOUNTER — TELEPHONE (OUTPATIENT)
Dept: NEUROLOGY | Facility: CLINIC | Age: 82
End: 2022-01-18
Payer: COMMERCIAL

## 2022-01-18 DIAGNOSIS — G40.909 SEIZURE DISORDER (H): ICD-10-CM

## 2022-01-18 RX ORDER — LEVETIRACETAM 750 MG/1
750 TABLET ORAL 2 TIMES DAILY
Qty: 120 TABLET | Refills: 1 | Status: SHIPPED | OUTPATIENT
Start: 2022-01-18 | End: 2022-04-21

## 2022-01-18 NOTE — TELEPHONE ENCOUNTER
[facility] Call Center    Phone Message    May a detailed message be left on voicemail: yes     Reason for Call: Other: Patient is requesting a call back to discuss levETIRAcetam (KEPPRA) 750 MG tablet medication refills, patient will be leaving to go out of town next week and would like to know if she can get a 2 months supply or if she can see Dr. Mack, please call patient at [phone] to advise.     Action Taken: Other: MPNU    Travel Screening: Not Applicable

## 2022-01-18 NOTE — TELEPHONE ENCOUNTER
Refill request for Keppra- 60 day supply requested. Pt has follow up scheduled with Dr. Mack on 3/22/22. Will send in 60 day supply per pt request.     Janis García RN on 1/18/2022 at 11:15 AM

## 2022-02-12 ENCOUNTER — HEALTH MAINTENANCE LETTER (OUTPATIENT)
Age: 82
End: 2022-02-12

## 2022-04-21 ENCOUNTER — OFFICE VISIT (OUTPATIENT)
Dept: NEUROLOGY | Facility: CLINIC | Age: 82
End: 2022-04-21
Payer: COMMERCIAL

## 2022-04-21 ENCOUNTER — LAB (OUTPATIENT)
Dept: LAB | Facility: HOSPITAL | Age: 82
End: 2022-04-21
Payer: COMMERCIAL

## 2022-04-21 VITALS
WEIGHT: 128.6 LBS | DIASTOLIC BLOOD PRESSURE: 82 MMHG | SYSTOLIC BLOOD PRESSURE: 128 MMHG | HEIGHT: 60 IN | BODY MASS INDEX: 25.25 KG/M2 | HEART RATE: 77 BPM

## 2022-04-21 DIAGNOSIS — Z79.899 ENCOUNTER FOR LONG-TERM (CURRENT) USE OF MEDICATIONS: ICD-10-CM

## 2022-04-21 DIAGNOSIS — G40.909 SEIZURE DISORDER (H): ICD-10-CM

## 2022-04-21 DIAGNOSIS — G40.909 SEIZURE DISORDER (H): Primary | ICD-10-CM

## 2022-04-21 PROCEDURE — 99215 OFFICE O/P EST HI 40 MIN: CPT | Performed by: PSYCHIATRY & NEUROLOGY

## 2022-04-21 PROCEDURE — 36415 COLL VENOUS BLD VENIPUNCTURE: CPT

## 2022-04-21 PROCEDURE — 80177 DRUG SCRN QUAN LEVETIRACETAM: CPT

## 2022-04-21 RX ORDER — LEVETIRACETAM 750 MG/1
750 TABLET ORAL 2 TIMES DAILY
Qty: 180 TABLET | Refills: 3 | Status: SHIPPED | OUTPATIENT
Start: 2022-04-21 | End: 2023-04-14

## 2022-04-21 NOTE — PROGRESS NOTES
INITIAL NEUROLOGY CONSULTATION - Transfer of Care    DATE OF VISIT: 4/21/2022  MRN: [id number]  PATIENT NAME: Silvia Solomon  YOB: 1940    REFERRING PROVIDER: No ref. provider found    Chief Complaint   Patient presents with     Seizures     Transfer care Dr. Louise- no recent episodes        SUBJECTIVE:                                                      HPI:   Silvia Solomon is a 81 year old female here to establish care for seizures.  She was previously followed by Dr. Louise in this clinic, and before that Dr. Fuentes.  Per chart review, the seizures started when the patient was in her 20s around the time of the birth of one of her children.  She was started on Depakote but then stopped it for a while but had recurrence of seizures so she was put back on the medication.  This provided good control but she developed hair loss in 2016 so she was switched to Keppra.  She did have some breakthrough seizures in 2016 and 2017, in the setting of stress.  There was a possibility she had missed some doses of her Keppra.  Dr. Louise increased the dose to 750mg twice daily, and she has been seizure-free since.  Seizures described as convulsive episodes of loss of consciousness.    Silvia confirms that she continues to be seizure-free.  No problems with side effects on the Keppra.  She does sometimes feel a little bit off if she misses a dose of the Keppra.  She says that once she takes the next dose she feels back to normal.  She is not sure if she really had auras prior to her seizures.  She herself does not recognize a seizure coming on, but her  says she seems a little bit confused before them.  I do not see any EEGs in the chart.  There is a head CT but I cannot view the images or the report.    Past Medical History:   Diagnosis Date     Arthritis      Depression      Dyslipidemia      Epileptic seizures (H)      Essential hypertension      History of transfusion     pt states many years ago      Hyperlipidemia      Hypertension      Mitral regurgitation 2012     Pancreatitis 2017     Postoperative anemia due to acute blood loss 2017     Seizures (H)     last seizure activity Dec 2016     Status post right knee replacement 2017     Past Surgical History:   Procedure Laterality Date     APPENDECTOMY       ARTHROSCOPY KNEE Right      CHOLECYSTECTOMY       DILATION AND CURETTAGE       ENDOSCOPIC RETROGRADE CHOLANGIOPANCREATOGRAM N/A 2017    Procedure: ENDOSCOPIC RETROGRADE CHOLANGIOPANCREATOGRAPHY;  Surgeon: Tomer Bob MD;  Location: [facility];  Service:      ESOPHAGOSCOPY, GASTROSCOPY, DUODENOSCOPY (EGD), COMBINED N/A 2017    Procedure: ENDOSCOPIC ULTRASOUND;  Surgeon: Tomer Bob MD;  Location: [facility];  Service:      GALLBLADDER SURGERY       TONSILLECTOMY       ZZC TOTAL KNEE ARTHROPLASTY Right 2017    Procedure: RIGHT TOTAL KNEE ARTHROPLASTY;  Surgeon: Mukesh Palencia MD;  Location: [facility];  Service: Orthopedics       aspirin (ASA) 81 MG chewable tablet, Take 81 mg by mouth daily  cholecalciferol (VITAMIN D3) 25 mcg (1000 units) capsule, Take 1,000 Units by mouth daily  clindamycin (CLEOCIN) 300 MG capsule, Take 1 capsule by mouth as needed Takes prior to dental appts  diltiazem ER (TIAZAC) 360 MG 24 hr ER beaded capsule, Take 360 mg by mouth every evening  losartan (COZAAR) 50 MG tablet, Take 1 tablet by mouth daily  sertraline (ZOLOFT) 50 MG tablet, Take 50 mg by mouth daily  simvastatin (ZOCOR) 20 MG tablet, Take 20 mg by mouth At Bedtime    No current facility-administered medications on file prior to visit.    Allergies   Allergen Reactions     Tetanus Toxoid      Cefuroxime Hives and Rash     Penicillins Hives and Rash        Problem (# of Occurrences) Relation (Name,Age of Onset)    Cerebrovascular Disease (1) Mother    Colon Cancer (1) Brother (Beau Brijesh)    Heart Disease (1) Brother (Beau Coley, 87.00): he  at home, no  autopsy    Hypertension (2) Mother, Father    No Known Problems (4) Father, Son, Son, Daughter    Suicidality (1) Son (Negrito, 31.00): cause of death       Negative family history of: Breast Cancer        Social History     Tobacco Use     Smoking status: Never Smoker     Smokeless tobacco: Never Used   Substance Use Topics     Alcohol use: Yes     Comment: Alcoholic Drinks/day: rarely (wine)     Drug use: No       REVIEW OF SYSTEMS:                                                      10-point review of systems is negative except as mentioned above in HPI.     EXAM:                                                      Physical Exam:   Vitals: /82 (BP Location: Left arm, Patient Position: Sitting)   Pulse 77   Ht 1.524 m (5')   Wt 58.3 kg (128 lb 9.6 oz)   LMP  (LMP Unknown)   BMI 25.12 kg/m    BMI= Body mass index is 25.12 kg/m .  GENERAL: NAD.  HEENT: NC/AT.   CV: RRR. S1, S2.   NECK: No bruits.  PULM: Non-labored breathing.   Neurologic:  MENTAL STATUS: Alert, attentive. Speech is fluent. Normal comprehension. Normal concentration. Adequate fund of knowledge.   CRANIAL NERVES: Discs flat. Visual fields intact to confrontation. Pupils equally, round and reactive to light. Facial sensation and movement normal. EOM full. Hearing intact to conversation. Trapezius strength intact. Palate moves symmetrically. Tongue midline.  MOTOR: 5/5 in proximal and distal muscle groups of upper and lower extremities. Tone and bulk normal.   DTRs: Intact and symmetric in biceps, BR, patellae.  Babinski down-going bilaterally.   SENSATION: Normal light touch and pinprick. Intact proprioception at great toes. Vibration: Normal at both ankles.   COORDINATION: Normal finger nose finger. Finger tapping normal. Knee heel shin normal.  STATION AND GAIT: Romberg negative.  Casual gait is normal.  Right hand-dominant.    Relevant Data:  Do not have previous brain imaging to review.    ASSESSMENT and PLAN:                                                       Assessment:     ICD-10-CM    1. Seizure disorder (H)  G40.909 levETIRAcetam (KEPPRA) 750 MG tablet     Keppra (Levetiracetam) Level   2. Encounter for long-term (current) use of medications  Z79.899 Keppra (Levetiracetam) Level        Ms. Solomon is a pleasant 81-year-old woman here for follow-up regarding epilepsy.  Her seizures have been under good control for the last 5 years.  No problems with tolerating the Keppra.  She does notice if she misses a dose, because she feels a little bit off until she takes the following dose.  No seizures though.  We will get an updated Keppra level and plan to follow-up again in 1 year.  Silvia understands and agrees with the plan.    Plan:  -- Continue the Keppra: 750mg twice daily.  -- Keppra level, for medication monitoring purposes.  We will notify you of the results.  -- Return to Neurology clinic in 1 year.  Please let us know if any concerns arise in the meantime.    Total Time: 40 minutes were spent with the patient and in chart review/documentation (as described above in Assessment and Plan) /coordinating the care on date of service.    Tatiana Mack MD  Neurology    CC: MD Alee Castañeda software used in the dictation of this note.

## 2022-04-21 NOTE — LETTER
4/21/2022         RE: Silvia Solomon  [address] [facility] [address]        Dear Colleague,    Thank you for referring your patient, Silvia Solomon, to the [facility] [place]. Please see a copy of my visit note below.    INITIAL NEUROLOGY CONSULTATION - Transfer of Care    DATE OF VISIT: 4/21/2022  MRN: [id number]  PATIENT NAME: Silvia Solomon  YOB: 1940    REFERRING PROVIDER: No ref. provider found    Chief Complaint   Patient presents with     Seizures     Transfer care Dr. Louise- no recent episodes        SUBJECTIVE:                                                      HPI:   Silvia Solomon is a 81 year old female here to establish care for seizures.  She was previously followed by Dr. Louise in this clinic, and before that Dr. Fuentes.  Per chart review, the seizures started when the patient was in her 20s around the time of the birth of one of her children.  She was started on Depakote but then stopped it for a while but had recurrence of seizures so she was put back on the medication.  This provided good control but she developed hair loss in 2016 so she was switched to Keppra.  She did have some breakthrough seizures in 2016 and 2017, in the setting of stress.  There was a possibility she had missed some doses of her Keppra.  Dr. Louise increased the dose to 750mg twice daily, and she has been seizure-free since.  Seizures described as convulsive episodes of loss of consciousness.    Silvia confirms that she continues to be seizure-free.  No problems with side effects on the Keppra.  She does sometimes feel a little bit off if she misses a dose of the Keppra.  She says that once she takes the next dose she feels back to normal.  She is not sure if she really had auras prior to her seizures.  She herself does not recognize a seizure coming on, but her  says she seems a little bit confused before them.  I do not see any EEGs in the chart.  There is  a head CT but I cannot view the images or the report.    Past Medical History:   Diagnosis Date     Arthritis      Depression      Dyslipidemia      Epileptic seizures (H)      Essential hypertension      History of transfusion     pt states many years ago     Hyperlipidemia      Hypertension      Mitral regurgitation 03/23/2012     Pancreatitis 04/2017     Postoperative anemia due to acute blood loss 05/26/2017     Seizures (H)     last seizure activity Dec 2016     Status post right knee replacement 05/24/2017     Past Surgical History:   Procedure Laterality Date     APPENDECTOMY       ARTHROSCOPY KNEE Right      CHOLECYSTECTOMY       DILATION AND CURETTAGE       ENDOSCOPIC RETROGRADE CHOLANGIOPANCREATOGRAM N/A 6/30/2017    Procedure: ENDOSCOPIC RETROGRADE CHOLANGIOPANCREATOGRAPHY;  Surgeon: Tomer Bob MD;  [place] [facility] [place]  Service:      ESOPHAGOSCOPY, GASTROSCOPY, DUODENOSCOPY (EGD), COMBINED N/A 5/12/2017    Procedure: ENDOSCOPIC ULTRASOUND;  Surgeon: Tomer Bob MD;  [place] [facility] [place]  Service:      GALLBLADDER SURGERY       TONSILLECTOMY       ZZC TOTAL KNEE ARTHROPLASTY Right 5/24/2017    Procedure: RIGHT TOTAL KNEE ARTHROPLASTY;  Surgeon: Mukesh Palencia MD;  Location: [facility];  Service: Orthopedics       aspirin (ASA) 81 MG chewable tablet, Take 81 mg by mouth daily  cholecalciferol (VITAMIN D3) 25 mcg (1000 units) capsule, Take 1,000 Units by mouth daily  clindamycin (CLEOCIN) 300 MG capsule, Take 1 capsule by mouth as needed Takes prior to dental appts  diltiazem ER (TIAZAC) 360 MG 24 hr ER beaded capsule, Take 360 mg by mouth every evening  losartan (COZAAR) 50 MG tablet, Take 1 tablet by mouth daily  sertraline (ZOLOFT) 50 MG tablet, Take 50 mg by mouth daily  simvastatin (ZOCOR) 20 MG tablet, Take 20 mg by mouth At Bedtime    No current facility-administered medications on file prior to visit.    Allergies   Allergen Reactions     Tetanus Toxoid       Cefuroxime Hives and Rash     Penicillins Hives and Rash        Problem (# of Occurrences) Relation (Name,Age of Onset)    Cerebrovascular Disease (1) Mother    Colon Cancer (1) Brother (Beau Coley)    Heart Disease (1) Brother (Beau Coley, 87.00): he  at home, no autopsy    Hypertension (2) Mother, Father    No Known Problems (4) Father, Son, Son, Daughter    Suicidality (1) Son (Negrito, 31.00): cause of death       Negative family history of: Breast Cancer        Social History     Tobacco Use     Smoking status: Never Smoker     Smokeless tobacco: Never Used   Substance Use Topics     Alcohol use: Yes     Comment: Alcoholic Drinks/day: rarely (wine)     Drug use: No       REVIEW OF SYSTEMS:                                                      10-point review of systems is negative except as mentioned above in HPI.     EXAM:                                                      Physical Exam:   Vitals: /82 (BP Location: Left arm, Patient Position: Sitting)   Pulse 77   Ht 1.524 m (5')   Wt 58.3 kg (128 lb 9.6 oz)   LMP  (LMP Unknown)   BMI 25.12 kg/m    BMI= Body mass index is 25.12 kg/m .  GENERAL: NAD.  HEENT: NC/AT.   CV: RRR. S1, S2.   NECK: No bruits.  PULM: Non-labored breathing.   Neurologic:  MENTAL STATUS: Alert, attentive. Speech is fluent. Normal comprehension. Normal concentration. Adequate fund of knowledge.   CRANIAL NERVES: Discs flat. Visual fields intact to confrontation. Pupils equally, round and reactive to light. Facial sensation and movement normal. EOM full. Hearing intact to conversation. Trapezius strength intact. Palate moves symmetrically. Tongue midline.  MOTOR: 5/5 in proximal and distal muscle groups of upper and lower extremities. Tone and bulk normal.   DTRs: Intact and symmetric in biceps, BR, patellae.  Babinski down-going bilaterally.   SENSATION: Normal light touch and pinprick. Intact proprioception at great toes. Vibration: Normal at both ankles.   COORDINATION: Normal  finger nose finger. Finger tapping normal. Knee heel shin normal.  STATION AND GAIT: Romberg negative.  Casual gait is normal.  Right hand-dominant.    Relevant Data:  Do not have previous brain imaging to review.    ASSESSMENT and PLAN:                                                      Assessment:     ICD-10-CM    1. Seizure disorder (H)  G40.909 levETIRAcetam (KEPPRA) 750 MG tablet     Keppra (Levetiracetam) Level   2. Encounter for long-term (current) use of medications  Z79.899 Keppra (Levetiracetam) Level        Ms. Solomon is a pleasant 81-year-old woman here for follow-up regarding epilepsy.  Her seizures have been under good control for the last 5 years.  No problems with tolerating the Keppra.  She does notice if she misses a dose, because she feels a little bit off until she takes the following dose.  No seizures though.  We will get an updated Keppra level and plan to follow-up again in 1 year.  Silvia understands and agrees with the plan.    Plan:  -- Continue the Keppra: 750mg twice daily.  -- Keppra level, for medication monitoring purposes.  We will notify you of the results.  -- Return to Neurology clinic in 1 year.  Please let us know if any concerns arise in the meantime.    Total Time: 40 minutes were spent with the patient and in chart review/documentation (as described above in Assessment and Plan) /coordinating the care on date of service.    Tatiana Mack MD  Neurology    CC: Renny Marion MD    Dragon software used in the dictation of this note.        Again, thank you for allowing me to participate in the care of your patient.        Sincerely,        Tatiana Mack MD

## 2022-04-21 NOTE — NURSING NOTE
Chief Complaint   Patient presents with     Seizures     Transfer care Dr. Louise- no recent episodes      Javon Sanchez CMA@ on 4/21/2022 at 2:29 PM

## 2022-04-21 NOTE — PATIENT INSTRUCTIONS
Plan:  -- Continue the Keppra: 750mg twice daily.  -- Keppra level, for medication monitoring purposes.  We will notify you of the results.  -- Return to Neurology clinic in 1 year.  Please let us know if any concerns arise in the meantime.

## 2022-04-23 LAB — LEVETIRACETAM SERPL-MCNC: 41 UG/ML

## 2022-04-26 NOTE — RESULT ENCOUNTER NOTE
Please let Silvia know that her Keppra level came back just above the therapeutic range.  I do not think we need to make any changes in the dosing at this time.    Thank you,  Dr. Mack

## 2022-06-20 ENCOUNTER — TELEPHONE (OUTPATIENT)
Dept: CARDIOLOGY | Facility: CLINIC | Age: 82
End: 2022-06-20

## 2022-06-20 DIAGNOSIS — I34.1 MITRAL VALVE PROLAPSE: ICD-10-CM

## 2022-06-20 DIAGNOSIS — I34.0 MITRAL REGURGITATION DUE TO CUSP PROLAPSE: Primary | ICD-10-CM

## 2022-06-20 DIAGNOSIS — I34.1 MITRAL REGURGITATION DUE TO CUSP PROLAPSE: Primary | ICD-10-CM

## 2022-06-20 DIAGNOSIS — I49.3 PVC'S (PREMATURE VENTRICULAR CONTRACTIONS): ICD-10-CM

## 2022-06-20 NOTE — TELEPHONE ENCOUNTER
[facility] Call Center    Phone Message    May a detailed message be left on voicemail: no     Reason for Call: Other: Silvia called to request an extension of the ECHO order in her chart. Please reach out to Silvia when complete.      Action Taken: Other: [facility]    Travel Screening: Not Applicable

## 2022-06-21 NOTE — TELEPHONE ENCOUNTER
Per Dr. Cardoso's 1-11-21 visit note:  Plan:  1.  Repeat echocardiogram in 1 year  2.  Discussed medical therapy for PVCs however given low burden will not make any changes at this time.  3.  Follow-up in 1 year     Updated echo order placed - return msg sent to sched team.

## 2022-07-14 ENCOUNTER — HOSPITAL ENCOUNTER (OUTPATIENT)
Dept: CARDIOLOGY | Facility: HOSPITAL | Age: 82
Discharge: HOME | End: 2022-07-14
Attending: INTERNAL MEDICINE | Admitting: INTERNAL MEDICINE
Payer: COMMERCIAL

## 2022-07-14 DIAGNOSIS — I49.3 PVC'S (PREMATURE VENTRICULAR CONTRACTIONS): ICD-10-CM

## 2022-07-14 DIAGNOSIS — I34.0 MITRAL REGURGITATION DUE TO CUSP PROLAPSE: ICD-10-CM

## 2022-07-14 DIAGNOSIS — I34.1 MITRAL VALVE PROLAPSE: ICD-10-CM

## 2022-07-14 DIAGNOSIS — I34.1 MITRAL REGURGITATION DUE TO CUSP PROLAPSE: ICD-10-CM

## 2022-07-14 LAB — LVEF ECHO: NORMAL

## 2022-07-14 PROCEDURE — 93306 TTE W/DOPPLER COMPLETE: CPT | Mod: 26 | Performed by: INTERNAL MEDICINE

## 2022-07-14 PROCEDURE — 93306 TTE W/DOPPLER COMPLETE: CPT

## 2022-08-10 ENCOUNTER — OFFICE VISIT (OUTPATIENT)
Dept: CARDIOLOGY | Facility: CLINIC | Age: 82
End: 2022-08-10
Payer: COMMERCIAL

## 2022-08-10 VITALS
HEART RATE: 84 BPM | DIASTOLIC BLOOD PRESSURE: 84 MMHG | HEIGHT: 60 IN | SYSTOLIC BLOOD PRESSURE: 152 MMHG | RESPIRATION RATE: 12 BRPM | WEIGHT: 127 LBS | BODY MASS INDEX: 24.94 KG/M2

## 2022-08-10 DIAGNOSIS — I34.0 NONRHEUMATIC MITRAL VALVE REGURGITATION: Primary | ICD-10-CM

## 2022-08-10 PROCEDURE — 99214 OFFICE O/P EST MOD 30 MIN: CPT | Performed by: INTERNAL MEDICINE

## 2022-08-10 NOTE — PROGRESS NOTES
HEART CARE ENCOUNTER CONSULTATON NOTE      [facility]  [phone]      Assessment/Recommendations   Assessment:    1.  Valvular heart disease: Moderate to severe mitral regurgitation with prolapse historically with no recent echocardiogram showing only mild regurgitation.  Doing well and asymptomatic.  2.    PVCs  3.  Hypertension: Mildly elevated however normally better controlled        Plan:  1.  Repeat echocardiogram in 1 year  2.    Follow-up in 1 year     History of Present Illness/Subjective    HPI: Silvia Solomon is a 81 year old female with history of moderate to severe mitral regurgitation with mitral valve prolapse who I am seeing today in follow-up.  Echocardiogram now shows only mild mitral regurgitation.  Overall she is feeling very well.  She admits she can exercise more although she is still very active for her age.  No complaints of chest pain or breathing difficulty.  No edema.  No recurrent palpitations.    Recent Echocardiogram Results:  7/14/22  Interpretation Summary     Left ventricular size, wall motion and function are normal. The ejection  fraction is 60-65%.  Normal right ventricle size and systolic function.  The left atrium is moderately dilated.  There is mild prolapse of the posterior mitral leaflet.  There is mild (1+) mitral regurgitation.     No previous study for comparison.     Physical Examination  Review of Systems   Vitals: BP (!) 152/84 (BP Location: Left arm, Patient Position: Sitting, Cuff Size: Adult Small)   Pulse 84   Resp 12   Ht 1.524 m (5')   Wt 57.6 kg (127 lb)   LMP  (LMP Unknown)   BMI 24.80 kg/m    BMI= Body mass index is 24.8 kg/m .  Wt Readings from Last 3 Encounters:   08/10/22 57.6 kg (127 lb)   04/21/22 58.3 kg (128 lb 9.6 oz)   01/11/21 61.2 kg (135 lb)       General Appearance:   no distress, normal body habitus   ENT/Mouth: membranes moist, no oral lesions or bleeding gums.      EYES:  no scleral icterus, normal conjunctivae    Neck: no carotid bruits or thyromegaly   Chest/Lungs:   lungs are clear to auscultation   Cardiovascular:   Regular. Normal first and second heart sounds with systolic murmur heard loudest at the apex no edema bilaterally    Abdomen:  no organomegaly, masses, bruits, or tenderness; bowel sounds are present   Extremities: no cyanosis or clubbing   Skin: no xanthelasma, warm.    Neurologic: normal  bilateral, no tremors     Psychiatric: alert and oriented x3, calm        Please refer above for cardiac ROS details.        Medical History  Surgical History Family History Social History   Past Medical History:   Diagnosis Date     Arthritis      Depression      Dyslipidemia      Epileptic seizures (H)      Essential hypertension      History of transfusion     pt states many years ago     Hyperlipidemia      Hypertension      Mitral regurgitation 03/23/2012     Pancreatitis 04/2017     Postoperative anemia due to acute blood loss 05/26/2017     Seizures (H)     last seizure activity Dec 2016     Status post right knee replacement 05/24/2017     Past Surgical History:   Procedure Laterality Date     APPENDECTOMY       ARTHROSCOPY KNEE Right      CHOLECYSTECTOMY       DILATION AND CURETTAGE       ENDOSCOPIC RETROGRADE CHOLANGIOPANCREATOGRAM N/A 6/30/2017    Procedure: ENDOSCOPIC RETROGRADE CHOLANGIOPANCREATOGRAPHY;  Surgeon: Tomer Bob MD;  Location: [facility];  Service:      ESOPHAGOSCOPY, GASTROSCOPY, DUODENOSCOPY (EGD), COMBINED N/A 5/12/2017    Procedure: ENDOSCOPIC ULTRASOUND;  Surgeon: Tomer Bob MD;  Location: [facility];  Service:      GALLBLADDER SURGERY       TONSILLECTOMY       ZZC TOTAL KNEE ARTHROPLASTY Right 5/24/2017    Procedure: RIGHT TOTAL KNEE ARTHROPLASTY;  Surgeon: Mukesh Palencia MD;  Location: [facility] OR;  Service: Orthopedics     Family History   Problem Relation Age of Onset     Cerebrovascular Disease Mother      No Known Problems Father      Heart Disease  Brother 87.00        he  at home, no autopsy     Colon Cancer Brother      Hypertension Mother      Hypertension Father      No Known Problems Son      Suicidality Son 31.00        cause of death     No Known Problems Son      No Known Problems Daughter      Breast Cancer No family hx of         Social History     Socioeconomic History     Marital status:      Spouse name: Not on file     Number of children: 4     Years of education: Not on file     Highest education level: Not on file   Occupational History     Not on file   Tobacco Use     Smoking status: Never Smoker     Smokeless tobacco: Never Used   Substance and Sexual Activity     Alcohol use: Yes     Comment: Alcoholic Drinks/day: rarely (wine)     Drug use: No     Sexual activity: Not Currently   Other Topics Concern     Parent/sibling w/ CABG, MI or angioplasty before 65F 55M? No   Social History Narrative    She walks for 30-35 minutes a week.     Social Determinants of Health     Financial Resource Strain: Not on file   Food Insecurity: Not on file   Transportation Needs: Not on file   Physical Activity: Not on file   Stress: Not on file   Social Connections: Not on file   Intimate Partner Violence: Not on file   Housing Stability: Not on file           Medications  Allergies   Current Outpatient Medications   Medication Sig Dispense Refill     aspirin (ASA) 81 MG chewable tablet Take 81 mg by mouth daily       cholecalciferol (VITAMIN D3) 25 mcg (1000 units) capsule Take 1,000 Units by mouth daily       clindamycin (CLEOCIN) 300 MG capsule Take 1 capsule by mouth as needed Takes prior to dental appts       diltiazem ER (TIAZAC) 360 MG 24 hr ER beaded capsule Take 360 mg by mouth every evening       levETIRAcetam (KEPPRA) 750 MG tablet Take 1 tablet (750 mg) by mouth 2 times daily 180 tablet 3     losartan (COZAAR) 50 MG tablet Take 1 tablet by mouth daily       sertraline (ZOLOFT) 50 MG tablet Take 50 mg by mouth daily       simvastatin  (ZOCOR) 20 MG tablet Take 20 mg by mouth At Bedtime         Allergies   Allergen Reactions     Tetanus Toxoid      Cefuroxime Hives and Rash     Penicillins Hives and Rash          Lab Results    Chemistry/lipid CBC Cardiac Enzymes/BNP/TSH/INR   Recent Labs   Lab Test 11/15/21  0914   CHOL 159   HDL 56   LDL 79   TRIG 121     Recent Labs   Lab Test 11/15/21  0914 05/11/21  1151 10/26/20  1012   LDL 79 93 89     Recent Labs   Lab Test 11/15/21  0914      POTASSIUM 4.2   CHLORIDE 109*   CO2 23      BUN 17   CR 0.76   GFRESTIMATED 75   VIMAL 10.1     Recent Labs   Lab Test 11/15/21  0914 05/11/21  1151 10/26/20  1012   CR 0.76 0.74 0.73     No results for input(s): A1C in the last 03496 hours.       Recent Labs   Lab Test 11/15/21  0914   WBC 6.9   HGB 14.4   HCT 43.8   MCV 92        Recent Labs   Lab Test 11/15/21  0914 12/20/18  0839   HGB 14.4 14.3    No results for input(s): TROPONINI in the last 24816 hours.  No results for input(s): BNP, NTBNPI, NTBNP in the last 93442 hours.  Recent Labs   Lab Test 11/15/21  0914   TSH 1.88     No results for input(s): INR in the last 34112 hours.     Stacy Cardoso MD

## 2022-08-10 NOTE — LETTER
8/10/2022    Renny Marion MD  [facility] [address]  [place] [address]    RE: Silvia Solomon       Dear Colleague,     I had the pleasure of seeing Silvia Solomon in the [facility].    HEART CARE ENCOUNTER CONSULTATON NOTE      [facility]  [phone]      Assessment/Recommendations   Assessment:    1.  Valvular heart disease: Moderate to severe mitral regurgitation with prolapse historically with no recent echocardiogram showing only mild regurgitation.  Doing well and asymptomatic.  2.    PVCs  3.  Hypertension: Mildly elevated however normally better controlled        Plan:  1.  Repeat echocardiogram in 1 year  2.    Follow-up in 1 year     History of Present Illness/Subjective    HPI: Silvia Solomon is a 81 year old female with history of moderate to severe mitral regurgitation with mitral valve prolapse who I am seeing today in follow-up.  Echocardiogram now shows only mild mitral regurgitation.  Overall she is feeling very well.  She admits she can exercise more although she is still very active for her age.  No complaints of chest pain or breathing difficulty.  No edema.  No recurrent palpitations.    Recent Echocardiogram Results:  7/14/22  Interpretation Summary     Left ventricular size, wall motion and function are normal. The ejection  fraction is 60-65%.  Normal right ventricle size and systolic function.  The left atrium is moderately dilated.  There is mild prolapse of the posterior mitral leaflet.  There is mild (1+) mitral regurgitation.     No previous study for comparison.     Physical Examination  Review of Systems   Vitals: BP (!) 152/84 (BP Location: Left arm, Patient Position: Sitting, Cuff Size: Adult Small)   Pulse 84   Resp 12   Ht 1.524 m (5')   Wt 57.6 kg (127 lb)   LMP  (LMP Unknown)   BMI 24.80 kg/m    BMI= Body mass index is 24.8 kg/m .  Wt Readings from Last 3 Encounters:   08/10/22 57.6 kg (127 lb)   04/21/22  58.3 kg (128 lb 9.6 oz)   01/11/21 61.2 kg (135 lb)       General Appearance:   no distress, normal body habitus   ENT/Mouth: membranes moist, no oral lesions or bleeding gums.      EYES:  no scleral icterus, normal conjunctivae   Neck: no carotid bruits or thyromegaly   Chest/Lungs:   lungs are clear to auscultation   Cardiovascular:   Regular. Normal first and second heart sounds with systolic murmur heard loudest at the apex no edema bilaterally    Abdomen:  no organomegaly, masses, bruits, or tenderness; bowel sounds are present   Extremities: no cyanosis or clubbing   Skin: no xanthelasma, warm.    Neurologic: normal  bilateral, no tremors     Psychiatric: alert and oriented x3, calm        Please refer above for cardiac ROS details.        Medical History  Surgical History Family History Social History   Past Medical History:   Diagnosis Date     Arthritis      Depression      Dyslipidemia      Epileptic seizures (H)      Essential hypertension      History of transfusion     pt states many years ago     Hyperlipidemia      Hypertension      Mitral regurgitation 03/23/2012     Pancreatitis 04/2017     Postoperative anemia due to acute blood loss 05/26/2017     Seizures (H)     last seizure activity Dec 2016     Status post right knee replacement 05/24/2017     Past Surgical History:   Procedure Laterality Date     APPENDECTOMY       ARTHROSCOPY KNEE Right      CHOLECYSTECTOMY       DILATION AND CURETTAGE       ENDOSCOPIC RETROGRADE CHOLANGIOPANCREATOGRAM N/A 6/30/2017    Procedure: ENDOSCOPIC RETROGRADE CHOLANGIOPANCREATOGRAPHY;  Surgeon: Tomer Bob MD;  Location: [facility];  Service:      ESOPHAGOSCOPY, GASTROSCOPY, DUODENOSCOPY (EGD), COMBINED N/A 5/12/2017    Procedure: ENDOSCOPIC ULTRASOUND;  Surgeon: Tomer Bob MD;  Location: [facility];  Service:      GALLBLADDER SURGERY       TONSILLECTOMY       ZZC TOTAL KNEE ARTHROPLASTY Right 5/24/2017    Procedure: RIGHT TOTAL KNEE  ARTHROPLASTY;  Surgeon: Mukesh Palencia MD;  Location: [facility] OR;  Service: Orthopedics     Family History   Problem Relation Age of Onset     Cerebrovascular Disease Mother      No Known Problems Father      Heart Disease Brother 87.00        he  at home, no autopsy     Colon Cancer Brother      Hypertension Mother      Hypertension Father      No Known Problems Son      Suicidality Son 31.00        cause of death     No Known Problems Son      No Known Problems Daughter      Breast Cancer No family hx of         Social History     Socioeconomic History     Marital status:      Spouse name: Not on file     Number of children: 4     Years of education: Not on file     Highest education level: Not on file   Occupational History     Not on file   Tobacco Use     Smoking status: Never Smoker     Smokeless tobacco: Never Used   Substance and Sexual Activity     Alcohol use: Yes     Comment: Alcoholic Drinks/day: rarely (wine)     Drug use: No     Sexual activity: Not Currently   Other Topics Concern     Parent/sibling w/ CABG, MI or angioplasty before 65F 55M? No   Social History Narrative    She walks for 30-35 minutes a week.     Social Determinants of Health     Financial Resource Strain: Not on file   Food Insecurity: Not on file   Transportation Needs: Not on file   Physical Activity: Not on file   Stress: Not on file   Social Connections: Not on file   Intimate Partner Violence: Not on file   Housing Stability: Not on file           Medications  Allergies   Current Outpatient Medications   Medication Sig Dispense Refill     aspirin (ASA) 81 MG chewable tablet Take 81 mg by mouth daily       cholecalciferol (VITAMIN D3) 25 mcg (1000 units) capsule Take 1,000 Units by mouth daily       clindamycin (CLEOCIN) 300 MG capsule Take 1 capsule by mouth as needed Takes prior to dental appts       diltiazem ER (TIAZAC) 360 MG 24 hr ER beaded capsule Take 360 mg by mouth every evening       levETIRAcetam  (KEPPRA) 750 MG tablet Take 1 tablet (750 mg) by mouth 2 times daily 180 tablet 3     losartan (COZAAR) 50 MG tablet Take 1 tablet by mouth daily       sertraline (ZOLOFT) 50 MG tablet Take 50 mg by mouth daily       simvastatin (ZOCOR) 20 MG tablet Take 20 mg by mouth At Bedtime         Allergies   Allergen Reactions     Tetanus Toxoid      Cefuroxime Hives and Rash     Penicillins Hives and Rash          Lab Results    Chemistry/lipid CBC Cardiac Enzymes/BNP/TSH/INR   Recent Labs   Lab Test 11/15/21  0914   CHOL 159   HDL 56   LDL 79   TRIG 121     Recent Labs   Lab Test 11/15/21  0914 05/11/21  1151 10/26/20  1012   LDL 79 93 89     Recent Labs   Lab Test 11/15/21  0914      POTASSIUM 4.2   CHLORIDE 109*   CO2 23      BUN 17   CR 0.76   GFRESTIMATED 75   VIMAL 10.1     Recent Labs   Lab Test 11/15/21  0914 05/11/21  1151 10/26/20  1012   CR 0.76 0.74 0.73     No results for input(s): A1C in the last 50775 hours.       Recent Labs   Lab Test 11/15/21  0914   WBC 6.9   HGB 14.4   HCT 43.8   MCV 92        Recent Labs   Lab Test 11/15/21  0914 12/20/18  0839   HGB 14.4 14.3    No results for input(s): TROPONINI in the last 06616 hours.  No results for input(s): BNP, NTBNPI, NTBNP in the last 90183 hours.  Recent Labs   Lab Test 11/15/21  0914   TSH 1.88     No results for input(s): INR in the last 78844 hours.     Stacy Cardoso MD              Thank you for allowing me to participate in the care of your patient.      Sincerely,     Stacy Cardoso MD     [facility]  cc:   Stacy Cardoso MD  [address]  [address]  [place] [address]

## 2022-10-09 ENCOUNTER — HEALTH MAINTENANCE LETTER (OUTPATIENT)
Age: 82
End: 2022-10-09

## 2022-11-17 ENCOUNTER — LAB REQUISITION (OUTPATIENT)
Dept: LAB | Facility: CLINIC | Age: 82
End: 2022-11-17

## 2022-11-17 DIAGNOSIS — I10 ESSENTIAL (PRIMARY) HYPERTENSION: ICD-10-CM

## 2022-11-17 DIAGNOSIS — E78.5 HYPERLIPIDEMIA, UNSPECIFIED: ICD-10-CM

## 2022-11-17 LAB
ERYTHROCYTE [DISTWIDTH] IN BLOOD BY AUTOMATED COUNT: 11.6 % (ref 10–15)
HCT VFR BLD AUTO: 43.3 % (ref 35–47)
HGB BLD-MCNC: 14.5 G/DL (ref 11.7–15.7)
MCH RBC QN AUTO: 30.5 PG (ref 26.5–33)
MCHC RBC AUTO-ENTMCNC: 33.5 G/DL (ref 31.5–36.5)
MCV RBC AUTO: 91 FL (ref 78–100)
PLATELET # BLD AUTO: 324 10E3/UL (ref 150–450)
RBC # BLD AUTO: 4.76 10E6/UL (ref 3.8–5.2)
WBC # BLD AUTO: 7.1 10E3/UL (ref 4–11)

## 2022-11-17 PROCEDURE — 80061 LIPID PANEL: CPT | Performed by: FAMILY MEDICINE

## 2022-11-17 PROCEDURE — 80053 COMPREHEN METABOLIC PANEL: CPT | Performed by: FAMILY MEDICINE

## 2022-11-17 PROCEDURE — 85027 COMPLETE CBC AUTOMATED: CPT | Performed by: FAMILY MEDICINE

## 2022-11-18 LAB
ALBUMIN SERPL BCG-MCNC: 4.7 G/DL (ref 3.5–5.2)
ALP SERPL-CCNC: 87 U/L (ref 35–104)
ALT SERPL W P-5'-P-CCNC: 12 U/L (ref 10–35)
ANION GAP SERPL CALCULATED.3IONS-SCNC: 15 MMOL/L (ref 7–15)
AST SERPL W P-5'-P-CCNC: 17 U/L (ref 10–35)
BILIRUB SERPL-MCNC: 0.6 MG/DL
BUN SERPL-MCNC: 18.2 MG/DL (ref 8–23)
CALCIUM SERPL-MCNC: 10.4 MG/DL (ref 8.8–10.2)
CHLORIDE SERPL-SCNC: 102 MMOL/L (ref 98–107)
CHOLEST SERPL-MCNC: 168 MG/DL
CREAT SERPL-MCNC: 0.75 MG/DL (ref 0.51–0.95)
DEPRECATED HCO3 PLAS-SCNC: 21 MMOL/L (ref 22–29)
GFR SERPL CREATININE-BSD FRML MDRD: 81 ML/MIN/1.73M2
GLUCOSE SERPL-MCNC: 93 MG/DL (ref 70–99)
HDLC SERPL-MCNC: 59 MG/DL
LDLC SERPL CALC-MCNC: 88 MG/DL
NONHDLC SERPL-MCNC: 109 MG/DL
POTASSIUM SERPL-SCNC: 4 MMOL/L (ref 3.4–5.3)
PROT SERPL-MCNC: 6.7 G/DL (ref 6.4–8.3)
SODIUM SERPL-SCNC: 138 MMOL/L (ref 136–145)
TRIGL SERPL-MCNC: 105 MG/DL

## 2023-02-18 ENCOUNTER — HEALTH MAINTENANCE LETTER (OUTPATIENT)
Age: 83
End: 2023-02-18

## 2023-04-14 DIAGNOSIS — G40.909 SEIZURE DISORDER (H): ICD-10-CM

## 2023-04-14 NOTE — TELEPHONE ENCOUNTER
Refill request for: levETIRAcetam (KEPPRA) 750 MG tablet   Directions: Take 1 tablet (750 mg) by mouth 2 times daily    LOV: 4/21/22  NOV: 4/24/23    90 day supply with 0 refills Medication T'd for review and signature  Michelle Restrepo CMA on 4/14/2023 at 2:46 PM

## 2023-04-15 RX ORDER — LEVETIRACETAM 750 MG/1
750 TABLET ORAL 2 TIMES DAILY
Qty: 180 TABLET | Refills: 0 | Status: SHIPPED | OUTPATIENT
Start: 2023-04-15 | End: 2023-04-28

## 2023-04-28 ENCOUNTER — OFFICE VISIT (OUTPATIENT)
Dept: NEUROLOGY | Facility: CLINIC | Age: 83
End: 2023-04-28
Payer: COMMERCIAL

## 2023-04-28 VITALS
SYSTOLIC BLOOD PRESSURE: 149 MMHG | WEIGHT: 130.4 LBS | HEART RATE: 70 BPM | DIASTOLIC BLOOD PRESSURE: 78 MMHG | BODY MASS INDEX: 25.47 KG/M2

## 2023-04-28 DIAGNOSIS — Z79.899 ENCOUNTER FOR LONG-TERM (CURRENT) USE OF MEDICATIONS: ICD-10-CM

## 2023-04-28 DIAGNOSIS — G40.909 SEIZURE DISORDER (H): Primary | ICD-10-CM

## 2023-04-28 PROCEDURE — 99213 OFFICE O/P EST LOW 20 MIN: CPT | Performed by: PSYCHIATRY & NEUROLOGY

## 2023-04-28 RX ORDER — LEVETIRACETAM 750 MG/1
750 TABLET ORAL 2 TIMES DAILY
Qty: 180 TABLET | Refills: 3 | Status: SHIPPED | OUTPATIENT
Start: 2023-04-28 | End: 2023-05-12 | Stop reason: DRUGHIGH

## 2023-04-28 NOTE — PROGRESS NOTES
ESTABLISHED PATIENT NEUROLOGY NOTE    DATE OF VISIT: 4/28/2023  MRN: [id number]  PATIENT NAME: Silvia Solomon  YOB: 1940    Chief Complaint   Patient presents with     Seizures     Annual follow-up   No seizures      SUBJECTIVE:                                                      HISTORY OF PRESENT ILLNESS:  Silvia is here for follow up regarding seizures.  I met her as a transfer of care about a year ago.    History as previously documented by me (4.21.22):  She was previously followed by Dr. Louise in this clinic, and before that Dr. Fuentes.  Per chart review, the seizures started when the patient was in her 20s around the time of the birth of one of her children.  She was started on Depakote but then stopped it for a while but had recurrence of seizures so she was put back on the medication.  This provided good control but she developed hair loss in 2016 so she was switched to Keppra.  She did have some breakthrough seizures in 2016 and 2017, in the setting of stress.  There was a possibility she had missed some doses of her Keppra.  Dr. Louise increased the dose to 750mg twice daily, and she has been seizure-free since.  Seizures described as convulsive episodes of loss of consciousness.     Silvia confirms that she continues to be seizure-free.  No problems with side effects on the Keppra.  She does sometimes feel a little bit off if she misses a dose of the Keppra.  She says that once she takes the next dose she feels back to normal.  She is not sure if she really had auras prior to her seizures.  She herself does not recognize a seizure coming on, but her  says she seems a little bit confused before them.  I do not see any EEGs in the chart.  There is a head CT but I cannot view the images or the report.    Keppra level was slightly above the therapeutic range after that visit at 41.  We did not make any changes to the dosing at that time.  Recent CMP shows normal kidney  function.    Silvia tells me that she continues to be seizure-free.  No problems with side effects on the Keppra.  No new concerns for me.    CURRENT MEDICATIONS:   aspirin (ASA) 81 MG chewable tablet, Take 81 mg by mouth daily  Calcium Carbonate (CALCIUM 600 PO),   cholecalciferol (VITAMIN D3) 25 mcg (1000 units) capsule, Take 1,000 Units by mouth daily  clindamycin (CLEOCIN) 300 MG capsule, Take 1 capsule by mouth as needed Takes prior to dental appts  diltiazem ER (TIAZAC) 360 MG 24 hr ER beaded capsule, Take 360 mg by mouth every evening  losartan (COZAAR) 50 MG tablet, Take 1 tablet by mouth daily  sertraline (ZOLOFT) 50 MG tablet, Take 50 mg by mouth daily  simvastatin (ZOCOR) 20 MG tablet, Take 20 mg by mouth At Bedtime    No current facility-administered medications on file prior to visit.      RECENT DIAGNOSTIC STUDIES:   Labs: No results found for any visits on 04/28/23.      REVIEW OF SYSTEMS:                                                      10-point review of systems is negative except as mentioned above in HPI.    EXAM:                                                      Physical Exam:   Vitals: BP (!) 149/78   Pulse 70   Wt 59.1 kg (130 lb 6.4 oz)   LMP  (LMP Unknown)   BMI 25.47 kg/m    BMI= Body mass index is 25.47 kg/m .  GENERAL: NAD.  HEENT: NC/AT.  CV: RRR. S1, S2.   NECK: No bruits.  PULM: Non-labored breathing.   Neurologic:  MENTAL STATUS: Alert, attentive. Speech is fluent. Normal comprehension. Normal concentration. Adequate fund of knowledge.   CRANIAL NERVES: Discs flat. Visual fields intact to confrontation. Pupils equally, round and reactive to light. Facial sensation and movement normal. EOM full. Hearing intact to conversation. Trapezius strength intact. Palate moves symmetrically. Tongue midline.  MOTOR: 5/5 in proximal and distal muscle groups of upper and lower extremities. Tone and bulk normal.   DTRs: Intact and symmetric in biceps, BR, patellae.  Babinski down-going  bilaterally.   SENSATION: Normal light touch and pinprick. Intact proprioception at great toes. Vibration: Normal at both ankles.   COORDINATION: Normal finger nose finger. Finger tapping normal. Knee heel shin normal.  STATION AND GAIT: Romberg negative.  Casual gait is normal.  Right hand-dominant.    ASSESSMENT and PLAN:                                                      Assessment:    ICD-10-CM    1. Seizure disorder (H)  G40.909 Keppra (Levetiracetam) Level     levETIRAcetam (KEPPRA) 750 MG tablet      2. Encounter for long-term (current) use of medications  Z79.899 Keppra (Levetiracetam) Level           Ms. Solomon is a pleasant 82-year-old woman here for follow-up regarding epilepsy.  She has been seizure-free for about 5 years now.  No problems with tolerating the Keppra.  We should check an updated level but otherwise we will plan on continuing without any change.  I would like to see Silvia back in clinic again in 1 year as long as she remains stable.  She understands and agrees with the plan.    Silvia to follow up with Primary Care provider regarding elevated blood pressure.    Plan:  Plan:  -- Continue the Keppra: 750mg twice daily.  -- Keppra level at your convenience.  We will notify you of the results.    -- Return to neurology clinic in 1 year as long as you remain seizure-free.  Let us know if any concerns arise in the meantime.    Total Time: 20 minutes were spent with the patient and in chart review/documentation (as described above in Assessment and Plan)/coordinating the care on date of service.    Tatiana Mack MD  Neurology    Dragon software used in the dictation of this note.

## 2023-04-28 NOTE — LETTER
4/28/2023         RE: Silvia Solomon  [address] [facility] [facility] [address]        Dear Colleague,    Thank you for referring your patient, Silvia Solomon, to the [facility] [place]. Please see a copy of my visit note below.    ESTABLISHED PATIENT NEUROLOGY NOTE    DATE OF VISIT: 4/28/2023  MRN: [id number]  PATIENT NAME: Silvia Solomon  YOB: 1940    Chief Complaint   Patient presents with     Seizures     Annual follow-up   No seizures      SUBJECTIVE:                                                      HISTORY OF PRESENT ILLNESS:  Silvia is here for follow up regarding seizures.  I met her as a transfer of care about a year ago.    History as previously documented by me (4.21.22):  She was previously followed by Dr. Louise in this clinic, and before that Dr. Fuentes.  Per chart review, the seizures started when the patient was in her 20s around the time of the birth of one of her children.  She was started on Depakote but then stopped it for a while but had recurrence of seizures so she was put back on the medication.  This provided good control but she developed hair loss in 2016 so she was switched to Keppra.  She did have some breakthrough seizures in 2016 and 2017, in the setting of stress.  There was a possibility she had missed some doses of her Keppra.  Dr. Louise increased the dose to 750mg twice daily, and she has been seizure-free since.  Seizures described as convulsive episodes of loss of consciousness.     Silvia confirms that she continues to be seizure-free.  No problems with side effects on the Keppra.  She does sometimes feel a little bit off if she misses a dose of the Keppra.  She says that once she takes the next dose she feels back to normal.  She is not sure if she really had auras prior to her seizures.  She herself does not recognize a seizure coming on, but her  says she seems a little bit confused before them.  I do not see any EEGs in  the chart.  There is a head CT but I cannot view the images or the report.    Keppra level was slightly above the therapeutic range after that visit at 41.  We did not make any changes to the dosing at that time.  Recent CMP shows normal kidney function.    Silvia tells me that she continues to be seizure-free.  No problems with side effects on the Keppra.  No new concerns for me.    CURRENT MEDICATIONS:   aspirin (ASA) 81 MG chewable tablet, Take 81 mg by mouth daily  Calcium Carbonate (CALCIUM 600 PO),   cholecalciferol (VITAMIN D3) 25 mcg (1000 units) capsule, Take 1,000 Units by mouth daily  clindamycin (CLEOCIN) 300 MG capsule, Take 1 capsule by mouth as needed Takes prior to dental appts  diltiazem ER (TIAZAC) 360 MG 24 hr ER beaded capsule, Take 360 mg by mouth every evening  losartan (COZAAR) 50 MG tablet, Take 1 tablet by mouth daily  sertraline (ZOLOFT) 50 MG tablet, Take 50 mg by mouth daily  simvastatin (ZOCOR) 20 MG tablet, Take 20 mg by mouth At Bedtime    No current facility-administered medications on file prior to visit.      RECENT DIAGNOSTIC STUDIES:   Labs: No results found for any visits on 04/28/23.      REVIEW OF SYSTEMS:                                                      10-point review of systems is negative except as mentioned above in HPI.    EXAM:                                                      Physical Exam:   Vitals: BP (!) 149/78   Pulse 70   Wt 59.1 kg (130 lb 6.4 oz)   LMP  (LMP Unknown)   BMI 25.47 kg/m    BMI= Body mass index is 25.47 kg/m .  GENERAL: NAD.  HEENT: NC/AT.  CV: RRR. S1, S2.   NECK: No bruits.  PULM: Non-labored breathing.   Neurologic:  MENTAL STATUS: Alert, attentive. Speech is fluent. Normal comprehension. Normal concentration. Adequate fund of knowledge.   CRANIAL NERVES: Discs flat. Visual fields intact to confrontation. Pupils equally, round and reactive to light. Facial sensation and movement normal. EOM full. Hearing intact to conversation. Trapezius  strength intact. Palate moves symmetrically. Tongue midline.  MOTOR: 5/5 in proximal and distal muscle groups of upper and lower extremities. Tone and bulk normal.   DTRs: Intact and symmetric in biceps, BR, patellae.  Babinski down-going bilaterally.   SENSATION: Normal light touch and pinprick. Intact proprioception at great toes. Vibration: Normal at both ankles.   COORDINATION: Normal finger nose finger. Finger tapping normal. Knee heel shin normal.  STATION AND GAIT: Romberg negative.  Casual gait is normal.  Right hand-dominant.    ASSESSMENT and PLAN:                                                      Assessment:    ICD-10-CM    1. Seizure disorder (H)  G40.909 Keppra (Levetiracetam) Level     levETIRAcetam (KEPPRA) 750 MG tablet      2. Encounter for long-term (current) use of medications  Z79.899 Keppra (Levetiracetam) Level           Ms. Solomon is a pleasant 82-year-old woman here for follow-up regarding epilepsy.  She has been seizure-free for about 5 years now.  No problems with tolerating the Keppra.  We should check an updated level but otherwise we will plan on continuing without any change.  I would like to see Silvia back in clinic again in 1 year as long as she remains stable.  She understands and agrees with the plan.    Silvia to follow up with Primary Care provider regarding elevated blood pressure.    Plan:  Plan:  -- Continue the Keppra: 750mg twice daily.  -- Keppra level at your convenience.  We will notify you of the results.    -- Return to neurology clinic in 1 year as long as you remain seizure-free.  Let us know if any concerns arise in the meantime.    Total Time: 20 minutes were spent with the patient and in chart review/documentation (as described above in Assessment and Plan)/coordinating the care on date of service.    Tatiana Mack MD  Neurology    Dragon software used in the dictation of this note.                      Again, thank you for allowing me to  participate in the care of your patient.        Sincerely,        Tatiana Mack MD

## 2023-04-28 NOTE — PATIENT INSTRUCTIONS
Plan:  -- Continue the Keppra: 750mg twice daily.  -- Keppra level at your convenience.  We will notify you of the results.    -- Return to neurology clinic in 1 year as long as you remain seizure-free.  Let us know if any concerns arise in the meantime.

## 2023-05-04 ENCOUNTER — LAB (OUTPATIENT)
Dept: LAB | Facility: CLINIC | Age: 83
End: 2023-05-04
Payer: COMMERCIAL

## 2023-05-04 DIAGNOSIS — G40.909 SEIZURE DISORDER (H): ICD-10-CM

## 2023-05-04 DIAGNOSIS — Z79.899 ENCOUNTER FOR LONG-TERM (CURRENT) USE OF MEDICATIONS: ICD-10-CM

## 2023-05-04 LAB — LEVETIRACETAM SERPL-MCNC: 56.1 ΜG/ML (ref 10–40)

## 2023-05-04 PROCEDURE — 36415 COLL VENOUS BLD VENIPUNCTURE: CPT

## 2023-05-04 PROCEDURE — 80177 DRUG SCRN QUAN LEVETIRACETAM: CPT

## 2023-05-12 ENCOUNTER — TELEPHONE (OUTPATIENT)
Dept: NEUROLOGY | Facility: CLINIC | Age: 83
End: 2023-05-12
Payer: COMMERCIAL

## 2023-05-12 DIAGNOSIS — R56.9 SEIZURES (H): Primary | ICD-10-CM

## 2023-05-12 RX ORDER — LEVETIRACETAM 500 MG/1
500 TABLET ORAL 2 TIMES DAILY
Qty: 60 TABLET | Refills: 3 | Status: SHIPPED | OUTPATIENT
Start: 2023-05-12 | End: 2023-08-22

## 2023-05-12 NOTE — TELEPHONE ENCOUNTER
Health Call Center    Phone Message    May a detailed message be left on voicemail: yes     Reason for Call: Medication Question or concern regarding medication   Prescription Clarification  Name of Medication: levETIRAcetam (KEPPRA) 750 MG tablet  Prescribing Provider: Basim Mack   Pharmacy: [facility] # [id number] [address] [address]   What on the order needs clarification?      pt states last time she was in her Keppra level was elevated and advised she should cut one down to 500 MG wondering if she could get a new prescription?      Please follow up with patient.    Phone number to reach patient:  Cell number on file:    Telephone Information:   Mobile [phone]       Action Taken: Message routed to: [facility]    Travel Screening: Not Applicable    Norm Olivares on 5/12/2023 at 9:16 AM   - Neurology

## 2023-05-15 NOTE — TELEPHONE ENCOUNTER
Relayed Provider message to Pt regarding checking keppra lvl.   Pt understood, no questions at the moment.     Javon Ignacio 5/15/2023 12:20 PM

## 2023-05-15 NOTE — TELEPHONE ENCOUNTER
"[facility] Call Center    Phone Message    May a detailed message be left on voicemail: yes     Reason for Call: Medication Question or concern regarding medication   Prescription Clarification  Name of Medication: levETIRAcetam (KEPPRA) 500 MG tablet  Prescribing Provider: Dr. Basim Mack   Pharmacy: [facility] # [id number] [address] [address]ME[address]   What on the order needs clarification? Pt is calling because she received a new prescription of the 500 mg Keppra. Pt is wanting to know if she should take 1 of the 500 mg and 1 of the 750 mg tablets. Pt picked up prescription and bottle stated take \"1 tab 2 times daily of 500 mg.\" Please call Pt to discuss        Action Taken: Other: [facility]    Travel Screening: Not Applicable                                                                      "

## 2023-05-15 NOTE — TELEPHONE ENCOUNTER
Patient informed to take Keppra 500mg qam and 750mg at bedtime per Dr. Mack  She is asking if she will need her level checked again? Please advise  Michelle Restrepo CMA on 5/15/2023 at 12:02 PM

## 2023-07-24 ENCOUNTER — LAB (OUTPATIENT)
Dept: LAB | Facility: HOSPITAL | Age: 83
End: 2023-07-24
Payer: COMMERCIAL

## 2023-07-24 DIAGNOSIS — R56.9 SEIZURES (H): ICD-10-CM

## 2023-07-24 LAB — LEVETIRACETAM SERPL-MCNC: 40.6 ΜG/ML (ref 10–40)

## 2023-07-24 PROCEDURE — 36415 COLL VENOUS BLD VENIPUNCTURE: CPT

## 2023-07-24 PROCEDURE — 80177 DRUG SCRN QUAN LEVETIRACETAM: CPT

## 2023-07-26 ENCOUNTER — LAB REQUISITION (OUTPATIENT)
Dept: LAB | Facility: CLINIC | Age: 83
End: 2023-07-26

## 2023-07-26 DIAGNOSIS — R35.0 FREQUENCY OF MICTURITION: ICD-10-CM

## 2023-07-26 PROCEDURE — 87186 SC STD MICRODIL/AGAR DIL: CPT | Performed by: FAMILY MEDICINE

## 2023-07-27 LAB — BACTERIA UR CULT: ABNORMAL

## 2023-08-04 ENCOUNTER — HOSPITAL ENCOUNTER (OUTPATIENT)
Dept: CARDIOLOGY | Facility: HOSPITAL | Age: 83
Discharge: HOME | End: 2023-08-04
Attending: INTERNAL MEDICINE | Admitting: INTERNAL MEDICINE
Payer: COMMERCIAL

## 2023-08-04 DIAGNOSIS — I34.0 NONRHEUMATIC MITRAL VALVE REGURGITATION: ICD-10-CM

## 2023-08-04 LAB — BI-PLANE LVEF ECHO: NORMAL

## 2023-08-04 PROCEDURE — 93306 TTE W/DOPPLER COMPLETE: CPT

## 2023-08-04 PROCEDURE — 93306 TTE W/DOPPLER COMPLETE: CPT | Mod: 26 | Performed by: GENERAL ACUTE CARE HOSPITAL

## 2023-08-21 ENCOUNTER — MYC MEDICAL ADVICE (OUTPATIENT)
Dept: NEUROLOGY | Facility: CLINIC | Age: 83
End: 2023-08-21
Payer: COMMERCIAL

## 2023-08-21 DIAGNOSIS — R56.9 SEIZURES (H): ICD-10-CM

## 2023-08-22 RX ORDER — LEVETIRACETAM 750 MG/1
750 TABLET ORAL DAILY
Qty: 30 TABLET | Refills: 3 | Status: SHIPPED | OUTPATIENT
Start: 2023-08-22 | End: 2024-05-16

## 2023-08-22 RX ORDER — LEVETIRACETAM 500 MG/1
500 TABLET ORAL DAILY
Qty: 30 TABLET | Refills: 3 | Status: SHIPPED | OUTPATIENT
Start: 2023-08-22 | End: 2024-05-16

## 2023-10-26 ENCOUNTER — OFFICE VISIT (OUTPATIENT)
Dept: CARDIOLOGY | Facility: CLINIC | Age: 83
End: 2023-10-26
Payer: COMMERCIAL

## 2023-10-26 VITALS
TEMPERATURE: 98.3 F | DIASTOLIC BLOOD PRESSURE: 74 MMHG | HEIGHT: 60 IN | BODY MASS INDEX: 25.31 KG/M2 | OXYGEN SATURATION: 96 % | HEART RATE: 66 BPM | RESPIRATION RATE: 14 BRPM | SYSTOLIC BLOOD PRESSURE: 132 MMHG | WEIGHT: 128.9 LBS

## 2023-10-26 DIAGNOSIS — I34.1 MVP (MITRAL VALVE PROLAPSE): Primary | ICD-10-CM

## 2023-10-26 PROCEDURE — 99214 OFFICE O/P EST MOD 30 MIN: CPT | Performed by: INTERNAL MEDICINE

## 2023-10-26 RX ORDER — ESCITALOPRAM OXALATE 10 MG/1
TABLET ORAL
COMMUNITY
Start: 2023-07-26

## 2023-10-26 RX ORDER — NITROFURANTOIN MACROCRYSTAL 100 MG
CAPSULE ORAL
COMMUNITY
Start: 2023-07-28

## 2023-10-26 NOTE — LETTER
10/26/2023    Marlen Quarles MD  [address]  [place] [address]    RE: Silvia Solomon       Dear Colleague,     I had the [facility] of seeing Sil[facility] Solomon in the [facility].    HEART CARE ENCOUNTER CONSULTATON NOTE      [facility]  [phone]      Assessment/Recommendations   Assessment:    1.  Valvular heart disease: History of moderate to severe mitral regurgitation with prolapse historically with most recent echocardiograms showing only mild regurgitation.  Doing well and asymptomatic.  2.  PVCs  3.  Hypertension: Well-controlled  4.  Seizure disorder        Plan:  1.  Repeat echocardiogram in 1 year  2.  Follow-up in 1 year         History of Present Illness/Subjective    HPI: Silvia Solomon is a 82 year old female with history of mitral valve prolapse with at least mild mitral vegetation most recent echo (previous echocardiogram suggested increased regurgitation) who I am seeing today in follow-up.  She has been doing well.  She admits to not walking as much recently but does plan on resuming this with her .  No complaints of chest pain, shortness of breath or edema.    Echocardiogram  8/4/2023  1. Left ventricular size, wall thickness and systolic function are normal. The  calculated left ventricular ejection fraction is 63%.  2. Right ventricular size and systolic function are normal.  3. Severe left atrial enlargement.  4. Mild mitral regurgitation which is likely functional due to mitral annular  dilatation from severe left atrial enlargement.  5. Compared to the prior study dated 7/14/2022, there has been no significant  change.     Physical Examination  Review of Systems   Vitals: /74 (BP Location: Left arm, Patient Position: Sitting, Cuff Size: Adult Regular)   Pulse 66   Temp 98.3  F (36.8  C) (Oral)   Resp 14   Ht 1.524 m (5')   Wt 58.5 kg (128 lb 14.4 oz)   LMP  (LMP Unknown)   SpO2 96%   BMI 25.17 kg/m    BMI= Body mass  index is 25.17 kg/m .  Wt Readings from Last 3 Encounters:   10/26/23 58.5 kg (128 lb 14.4 oz)   04/28/23 59.1 kg (130 lb 6.4 oz)   08/10/22 57.6 kg (127 lb)       General Appearance:   no distress, normal body habitus   ENT/Mouth: membranes moist, no oral lesions or bleeding gums.      EYES:  no scleral icterus, normal conjunctivae   Neck: no carotid bruits or thyromegaly   Chest/Lungs:   lungs are clear to auscultation   Cardiovascular:   Regular. Normal first and second heart sounds with holosystolic murmur at apex no edema bilaterally    Abdomen:  bowel sounds are present   Extremities: no cyanosis or clubbing   Skin: no xanthelasma, warm.    Neurologic: normal  bilateral, no tremors     Psychiatric: alert and oriented x3, calm        Please refer above for cardiac ROS details.        Medical History  Surgical History Family History Social History   Past Medical History:   Diagnosis Date    Arthritis     Depression     Dyslipidemia     Epileptic seizures (H)     Essential hypertension     History of transfusion     pt states many years ago    Hyperlipidemia     Hypertension     Mitral regurgitation 03/23/2012    Pancreatitis 04/2017    Postoperative anemia due to acute blood loss 05/26/2017    Seizures (H)     last seizure activity Dec 2016    Status post right knee replacement 05/24/2017     Past Surgical History:   Procedure Laterality Date    APPENDECTOMY      ARTHROSCOPY KNEE Right     CHOLECYSTECTOMY      DILATION AND CURETTAGE      ENDOSCOPIC RETROGRADE CHOLANGIOPANCREATOGRAM N/A 6/30/2017    Procedure: ENDOSCOPIC RETROGRADE CHOLANGIOPANCREATOGRAPHY;  Surgeon: Tomer Bob MD;  Location: [facility];  Service:     ESOPHAGOSCOPY, GASTROSCOPY, DUODENOSCOPY (EGD), COMBINED N/A 5/12/2017    Procedure: ENDOSCOPIC ULTRASOUND;  Surgeon: Tomer Bob MD;  Location: [facility];  Service:     GALLBLADDER SURGERY      TONSILLECTOMY      ZZC TOTAL KNEE ARTHROPLASTY Right 5/24/2017    Procedure:  RIGHT TOTAL KNEE ARTHROPLASTY;  Surgeon: Mukesh Palencia MD;  Location: [facility] OR;  Service: Orthopedics     Family History   Problem Relation Age of Onset    Cerebrovascular Disease Mother     No Known Problems Father     Heart Disease Brother 87.00        he  at home, no autopsy    Colon Cancer Brother     Hypertension Mother     Hypertension Father     No Known Problems Son     Suicidality Son 31.00        cause of death    No Known Problems Son     No Known Problems Daughter     Breast Cancer No family hx of         Social History     Socioeconomic History    Marital status:      Spouse name: Not on file    Number of children: 4    Years of education: Not on file    Highest education level: Not on file   Occupational History    Not on file   Tobacco Use    Smoking status: Never    Smokeless tobacco: Never   Vaping Use    Vaping Use: Never used   Substance and Sexual Activity    Alcohol use: Yes     Comment: Alcoholic Drinks/day: rarely (wine)    Drug use: No    Sexual activity: Not Currently     Partners: Male   Other Topics Concern    Parent/sibling w/ CABG, MI or angioplasty before 65F 55M? No   Social History Narrative    She walks for 30-35 minutes a week.     Social Determinants of Health     Financial Resource Strain: Not on file   Food Insecurity: Not on file   Transportation Needs: Not on file   Physical Activity: Not on file   Stress: Not on file   Social Connections: Not on file   Interpersonal Safety: Not on file   Housing Stability: Not on file           Medications  Allergies   Current Outpatient Medications   Medication Sig Dispense Refill    aspirin (ASA) 81 MG chewable tablet Take 81 mg by mouth daily      Calcium Carbonate (CALCIUM 600 PO)       cholecalciferol (VITAMIN D3) 25 mcg (1000 units) capsule Take 1,000 Units by mouth daily      clindamycin (CLEOCIN) 300 MG capsule Take 1 capsule by mouth as needed Takes prior to dental appts      diltiazem ER (TIAZAC) 360 MG 24 hr  "ER beaded capsule Take 360 mg by mouth every evening      escitalopram (LEXAPRO) 10 MG tablet 1 tablet Orally Once a day for anxiety      levETIRAcetam (KEPPRA) 500 MG tablet Take 1 tablet (500 mg) by mouth daily Take in the evening. 30 tablet 3    levETIRAcetam (KEPPRA) 750 MG tablet Take 1 tablet (750 mg) by mouth daily Take in the morning. 30 tablet 3    losartan (COZAAR) 50 MG tablet Take 1 tablet by mouth daily      nitroFURantoin macrocrystal (MACRODANTIN) 100 MG capsule       simvastatin (ZOCOR) 20 MG tablet Take 20 mg by mouth At Bedtime         Allergies   Allergen Reactions    Tetanus Immune Globulin      Other Reaction(s): severe redness    Tetanus Toxoid     Cefuroxime Hives and Rash    Penicillins Hives and Rash          Lab Results    Chemistry/lipid CBC Cardiac Enzymes/BNP/TSH/INR   Recent Labs   Lab Test 11/17/22  1333   CHOL 168   HDL 59   LDL 88   TRIG 105     Recent Labs   Lab Test 11/17/22  1333 11/15/21  0914 05/11/21  1151   LDL 88 79 93     Recent Labs   Lab Test 11/17/22  1333      POTASSIUM 4.0   CHLORIDE 102   CO2 21*   GLC 93   BUN 18.2   CR 0.75   GFRESTIMATED 81   VIMAL 10.4*     Recent Labs   Lab Test 11/17/22  1333 11/15/21  0914 05/11/21  1151   CR 0.75 0.76 0.74     No results for input(s): \"A1C\" in the last 51567 hours.       Recent Labs   Lab Test 11/17/22  1333   WBC 7.1   HGB 14.5   HCT 43.3   MCV 91        Recent Labs   Lab Test 11/17/22  1333 11/15/21  0914 12/20/18  0839   HGB 14.5 14.4 14.3    No results for input(s): \"TROPONINI\" in the last 07156 hours.  No results for input(s): \"BNP\", \"NTBNPI\", \"NTBNP\" in the last 58313 hours.  Recent Labs   Lab Test 11/15/21  0914   TSH 1.88     No results for input(s): \"INR\" in the last 86284 hours.     Stacy Cardoso MD    Thank you for allowing me to participate in the care of your patient.      Sincerely,   Stacy Cardoso MD   [facility]  cc: No referring provider " defined for this encounter.

## 2023-10-26 NOTE — PROGRESS NOTES
HEART CARE ENCOUNTER CONSULTATON NOTE      [facility]  [phone]      Assessment/Recommendations   Assessment:    1.  Valvular heart disease: History of moderate to severe mitral regurgitation with prolapse historically with most recent echocardiograms showing only mild regurgitation.  Doing well and asymptomatic.  2.  PVCs  3.  Hypertension: Well-controlled  4.  Seizure disorder        Plan:  1.  Repeat echocardiogram in 1 year  2.  Follow-up in 1 year         History of Present Illness/Subjective    HPI: Silvia Solomon is a 82 year old female with history of mitral valve prolapse with at least mild mitral vegetation most recent echo (previous echocardiogram suggested increased regurgitation) who I am seeing today in follow-up.  She has been doing well.  She admits to not walking as much recently but does plan on resuming this with her .  No complaints of chest pain, shortness of breath or edema.    Echocardiogram  8/4/2023  1. Left ventricular size, wall thickness and systolic function are normal. The  calculated left ventricular ejection fraction is 63%.  2. Right ventricular size and systolic function are normal.  3. Severe left atrial enlargement.  4. Mild mitral regurgitation which is likely functional due to mitral annular  dilatation from severe left atrial enlargement.  5. Compared to the prior study dated 7/14/2022, there has been no significant  change.     Physical Examination  Review of Systems   Vitals: /74 (BP Location: Left arm, Patient Position: Sitting, Cuff Size: Adult Regular)   Pulse 66   Temp 98.3  F (36.8  C) (Oral)   Resp 14   Ht 1.524 m (5')   Wt 58.5 kg (128 lb 14.4 oz)   LMP  (LMP Unknown)   SpO2 96%   BMI 25.17 kg/m    BMI= Body mass index is 25.17 kg/m .  Wt Readings from Last 3 Encounters:   10/26/23 58.5 kg (128 lb 14.4 oz)   04/28/23 59.1 kg (130 lb 6.4 oz)   08/10/22 57.6 kg (127 lb)       General Appearance:   no distress, normal body  habitus   ENT/Mouth: membranes moist, no oral lesions or bleeding gums.      EYES:  no scleral icterus, normal conjunctivae   Neck: no carotid bruits or thyromegaly   Chest/Lungs:   lungs are clear to auscultation   Cardiovascular:   Regular. Normal first and second heart sounds with holosystolic murmur at apex no edema bilaterally    Abdomen:  bowel sounds are present   Extremities: no cyanosis or clubbing   Skin: no xanthelasma, warm.    Neurologic: normal  bilateral, no tremors     Psychiatric: alert and oriented x3, calm        Please refer above for cardiac ROS details.        Medical History  Surgical History Family History Social History   Past Medical History:   Diagnosis Date     Arthritis      Depression      Dyslipidemia      Epileptic seizures (H)      Essential hypertension      History of transfusion     pt states many years ago     Hyperlipidemia      Hypertension      Mitral regurgitation 03/23/2012     Pancreatitis 04/2017     Postoperative anemia due to acute blood loss 05/26/2017     Seizures (H)     last seizure activity Dec 2016     Status post right knee replacement 05/24/2017     Past Surgical History:   Procedure Laterality Date     APPENDECTOMY       ARTHROSCOPY KNEE Right      CHOLECYSTECTOMY       DILATION AND CURETTAGE       ENDOSCOPIC RETROGRADE CHOLANGIOPANCREATOGRAM N/A 6/30/2017    Procedure: ENDOSCOPIC RETROGRADE CHOLANGIOPANCREATOGRAPHY;  Surgeon: Tomer Bob MD;  Location: [facility];  Service:      ESOPHAGOSCOPY, GASTROSCOPY, DUODENOSCOPY (EGD), COMBINED N/A 5/12/2017    Procedure: ENDOSCOPIC ULTRASOUND;  Surgeon: Tomer Bob MD;  Location: [facility];  Service:      GALLBLADDER SURGERY       TONSILLECTOMY       ZZC TOTAL KNEE ARTHROPLASTY Right 5/24/2017    Procedure: RIGHT TOTAL KNEE ARTHROPLASTY;  Surgeon: Mukesh Palencia MD;  Location: [facility];  Service: Orthopedics     Family History   Problem Relation Age of Onset     Cerebrovascular  Disease Mother      No Known Problems Father      Heart Disease Brother 87.00        he  at home, no autopsy     Colon Cancer Brother      Hypertension Mother      Hypertension Father      No Known Problems Son      Suicidality Son 31.00        cause of death     No Known Problems Son      No Known Problems Daughter      Breast Cancer No family hx of         Social History     Socioeconomic History     Marital status:      Spouse name: Not on file     Number of children: 4     Years of education: Not on file     Highest education level: Not on file   Occupational History     Not on file   Tobacco Use     Smoking status: Never     Smokeless tobacco: Never   Vaping Use     Vaping Use: Never used   Substance and Sexual Activity     Alcohol use: Yes     Comment: Alcoholic Drinks/day: rarely (wine)     Drug use: No     Sexual activity: Not Currently     Partners: Male   Other Topics Concern     Parent/sibling w/ CABG, MI or angioplasty before 65F 55M? No   Social History Narrative    She walks for 30-35 minutes a week.     Social Determinants of Health     Financial Resource Strain: Not on file   Food Insecurity: Not on file   Transportation Needs: Not on file   Physical Activity: Not on file   Stress: Not on file   Social Connections: Not on file   Interpersonal Safety: Not on file   Housing Stability: Not on file           Medications  Allergies   Current Outpatient Medications   Medication Sig Dispense Refill     aspirin (ASA) 81 MG chewable tablet Take 81 mg by mouth daily       Calcium Carbonate (CALCIUM 600 PO)        cholecalciferol (VITAMIN D3) 25 mcg (1000 units) capsule Take 1,000 Units by mouth daily       clindamycin (CLEOCIN) 300 MG capsule Take 1 capsule by mouth as needed Takes prior to dental appts       diltiazem ER (TIAZAC) 360 MG 24 hr ER beaded capsule Take 360 mg by mouth every evening       escitalopram (LEXAPRO) 10 MG tablet 1 tablet Orally Once a day for anxiety       levETIRAcetam  "(KEPPRA) 500 MG tablet Take 1 tablet (500 mg) by mouth daily Take in the evening. 30 tablet 3     levETIRAcetam (KEPPRA) 750 MG tablet Take 1 tablet (750 mg) by mouth daily Take in the morning. 30 tablet 3     losartan (COZAAR) 50 MG tablet Take 1 tablet by mouth daily       nitroFURantoin macrocrystal (MACRODANTIN) 100 MG capsule        simvastatin (ZOCOR) 20 MG tablet Take 20 mg by mouth At Bedtime         Allergies   Allergen Reactions     Tetanus Immune Globulin      Other Reaction(s): severe redness     Tetanus Toxoid      Cefuroxime Hives and Rash     Penicillins Hives and Rash          Lab Results    Chemistry/lipid CBC Cardiac Enzymes/BNP/TSH/INR   Recent Labs   Lab Test 11/17/22  1333   CHOL 168   HDL 59   LDL 88   TRIG 105     Recent Labs   Lab Test 11/17/22  1333 11/15/21  0914 05/11/21  1151   LDL 88 79 93     Recent Labs   Lab Test 11/17/22  1333      POTASSIUM 4.0   CHLORIDE 102   CO2 21*   GLC 93   BUN 18.2   CR 0.75   GFRESTIMATED 81   VIMAL 10.4*     Recent Labs   Lab Test 11/17/22  1333 11/15/21  0914 05/11/21  1151   CR 0.75 0.76 0.74     No results for input(s): \"A1C\" in the last 11068 hours.       Recent Labs   Lab Test 11/17/22  1333   WBC 7.1   HGB 14.5   HCT 43.3   MCV 91        Recent Labs   Lab Test 11/17/22  1333 11/15/21  0914 12/20/18  0839   HGB 14.5 14.4 14.3    No results for input(s): \"TROPONINI\" in the last 89007 hours.  No results for input(s): \"BNP\", \"NTBNPI\", \"NTBNP\" in the last 62752 hours.  Recent Labs   Lab Test 11/15/21  0914   TSH 1.88     No results for input(s): \"INR\" in the last 80788 hours.     Stacy Cardoso MD                                      "

## 2023-11-20 ENCOUNTER — LAB REQUISITION (OUTPATIENT)
Dept: LAB | Facility: CLINIC | Age: 83
End: 2023-11-20

## 2023-11-20 DIAGNOSIS — E78.5 HYPERLIPIDEMIA, UNSPECIFIED: ICD-10-CM

## 2023-11-20 DIAGNOSIS — I10 ESSENTIAL (PRIMARY) HYPERTENSION: ICD-10-CM

## 2023-11-20 PROCEDURE — 80053 COMPREHEN METABOLIC PANEL: CPT | Performed by: FAMILY MEDICINE

## 2023-11-20 PROCEDURE — 80061 LIPID PANEL: CPT | Performed by: FAMILY MEDICINE

## 2023-11-21 LAB
ALBUMIN SERPL BCG-MCNC: 4.5 G/DL (ref 3.5–5.2)
ALP SERPL-CCNC: 70 U/L (ref 40–150)
ALT SERPL W P-5'-P-CCNC: 15 U/L (ref 0–50)
ANION GAP SERPL CALCULATED.3IONS-SCNC: 12 MMOL/L (ref 7–15)
AST SERPL W P-5'-P-CCNC: 16 U/L (ref 0–45)
BILIRUB SERPL-MCNC: 0.5 MG/DL
BUN SERPL-MCNC: 21.6 MG/DL (ref 8–23)
CALCIUM SERPL-MCNC: 9.5 MG/DL (ref 8.8–10.2)
CHLORIDE SERPL-SCNC: 105 MMOL/L (ref 98–107)
CHOLEST SERPL-MCNC: 182 MG/DL
CREAT SERPL-MCNC: 0.84 MG/DL (ref 0.51–0.95)
DEPRECATED HCO3 PLAS-SCNC: 24 MMOL/L (ref 22–29)
EGFRCR SERPLBLD CKD-EPI 2021: 70 ML/MIN/1.73M2
GLUCOSE SERPL-MCNC: 95 MG/DL (ref 70–99)
HDLC SERPL-MCNC: 64 MG/DL
LDLC SERPL CALC-MCNC: 96 MG/DL
NONHDLC SERPL-MCNC: 118 MG/DL
POTASSIUM SERPL-SCNC: 3.9 MMOL/L (ref 3.4–5.3)
PROT SERPL-MCNC: 6.8 G/DL (ref 6.4–8.3)
SODIUM SERPL-SCNC: 141 MMOL/L (ref 135–145)
TRIGL SERPL-MCNC: 112 MG/DL

## 2024-03-16 ENCOUNTER — HEALTH MAINTENANCE LETTER (OUTPATIENT)
Age: 84
End: 2024-03-16

## 2024-05-14 NOTE — PROGRESS NOTES
Progress Notes by Stacy Cardoso MD at 6/5/2019 11:30 AM     Author: Stacy Cardoso MD Service: -- Author Type: Physician    Filed: 6/5/2019 12:18 PM Encounter Date: 6/5/2019 Status: Signed    : Stacy Cardoso MD (Physician)           Click to link to [facility] HEART CARE NOTE    Thank you, Dr. Marion, for asking us to see Silvia Denney at the [facility].      Assessment/Recommendations   Assessment:    1.  Valvular heart disease: Moderate mitral regurgitation on her last assessment done at [facility] in January 2018.    Recommend repeat echocardiogram for reevaluation this year.  She seems asymptomatic.    2.  Hypertension:  Blood pressure mildly elevated today however at home her blood pressures have remained well controlled.  3.  Coronary artery disease: Mild and nonobstructive.  Lipids from 12/20/2018 reviewed and are well controlled.  Follow-up in 6 months.       History of Present Illness    Ms. Silvia Denney is a 78 y.o. female with long history of mitral regurgitation secondary to posterior leaflet prolapse who I am seeing today in follow-up.    In 2017 she underwent an echocardiogram that showed EF of 48% with moderate to severe mitral regurgitation.  I referred her to see a surgeon and she saw Dr Rosario regarding mitral valve repair/replacement.  She underwent a NASIR that showed only moderate mitral regurgitation and CT coronary angiogram showed nonobstructive coronary artery disease.    Clearly has not noted any issues with shortness of breath or chest pain.  She went to [place] in February and is able to walk 3 to 5 miles a day without a problem.  No problems with swelling orthopnea.  Blood pressure mildly elevated today however normally better controlled.  She reports her systolic blood pressure has been in the 120s at home.    NASIR with bubble 8/28/2017 [facility]  Final Impressions:   1. Normal  Test results reviewed. Patient sent message through portal. Jenise Fox, DO   left ventricular size, normal global systolic function with an estimated EF of 65 - 70%.   2. 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   3. Focal prolapse without flail of P2 segment of posterior leaflet.   4. Negative bubble study.   5. No evidence of thrombus present in the left atrial appendage.   6. PFO present.    Exercise stress echocardiogram 1/2/2018  Final Impressions:     1. Maximum stress test with 110.4% of age predicted maximum heart rate   achieved.   2. During stress exam the patient developed no significant symptoms.   3. Post stress, normal left ventricular size, normal global systolic   function.   4. There were no ischemic changes by EKG during stress.   5. Negative stress echo for ischemia.   6. Mild mitral valve prolapse.   7. The mitral valve is sclerotic, moderate mitral regurgitation.   8. With exercise, there is no change in MR degree. Remains moderate. The   estimated PAP pressures only increase mildly to 36 mm Hg plus RA from 29   mm Hg plus RA.    CT coronary angiogram with calcium scoring    1. Mild nonobstructive coronary artery disease.  a. Calcium score 95.2 (62nd percentile for age and gender).  b. No severe coronary stenosis.  2. Enlarged left atrium with normal visualized appendage.  a. 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  b. Tiny patent foramen ovale with tiny left to right contrast jet.  3. Aortic atherosclerosis.  4. Please see the separate radiology dictation for noncardiovascular   findings.        Rojas Antony MD  [facility]   D: 8/28/2017 T: 8/28/2017           Physical Examination Review of Systems   Vitals:    06/05/19 1126   BP: 148/80   Pulse: 84   Resp: 16     Body mass index is 26.56 kg/m .  Wt Readings from Last 3 Encounters:   06/05/19 136 lb (61.7 kg)   09/28/18 138 lb (62.6 kg)   06/30/17 123 lb 2 oz (55.8 kg)       General Appearance:   alert, no  apparent distress   HEENT:  no scleral icterus; the mucous membranes are pink and moist                                  Neck: No jvd   Chest: the spine is straight and the chest is symmetric   Lungs:   respirations unlabored; the lungs are clear to auscultation   Cardiovascular:   regular rhythm with normal first and second heart sounds and 2/6 holosystolic murmur heard at the apex   Abdomen:  no organomegaly, masses, bruits, or tenderness; bowel sounds are present   Extremities: no cyanosis, clubbing, or edema   Skin: no xanthelasma    General: Night Sweats  Eyes: WNL  Ears/Nose/Throat: WNL  Lungs: WNL  Heart: WNL  Stomach: WNL  Bladder: Frequent Urination at Night  Muscle/Joints: WNL  Skin: WNL  Nervous System: WNL  Mental Health: WNL     Blood: WNL     Medical History  Surgical History Family History Social History   Past Medical History:   Diagnosis Date   ? Arthritis    ? Depression    ? Dyslipidemia    ? Epileptic seizures (H)    ? History of transfusion     pt states many years ago   ? Hypertension    ? Mitral regurgitation    ? Palpitations    ? Pancreatitis 04/2017   ? Seizures (H)     last seizure activity Dec 2016   ? Valvular heart disease     Past Surgical History:   Procedure Laterality Date   ? APPENDECTOMY     ? CHOLECYSTECTOMY     ? DILATION AND CURETTAGE OF UTERUS     ? ERCP N/A 6/30/2017    Procedure: ENDOSCOPIC RETROGRADE CHOLANGIOPANCREATOGRAPHY;  Surgeon: Tomer Bob MD;  Location: [facility];  Service:    ? ESOPHAGOGASTRODUODENOSCOPY N/A 5/12/2017    Procedure: ENDOSCOPIC ULTRASOUND;  Surgeon: Tomer Bob MD;  Location: [facility];  Service:    ? GALLBLADDER SURGERY     ? KNEE ARTHROSCOPY Right    ? IA TOTAL KNEE ARTHROPLASTY Right 5/24/2017    Procedure: RIGHT TOTAL KNEE ARTHROPLASTY;  Surgeon: Mukesh Palencia MD;  Location: [facility];  Service: Orthopedics   ? TONSILLECTOMY      Family History   Problem Relation Age of Onset   ? Heart disease Brother      Social History     Socioeconomic History   ? Marital status:      Spouse name: Not on file   ? Number of children: Not on file   ? Years of education: Not on file   ? Highest education level: Not on file   Occupational History   ? Not on file   Social Needs   ? Financial resource strain: Not on file   ? Food insecurity:     Worry: Not on file     Inability: Not on file   ? Transportation needs:     Medical: Not on file     Non-medical: Not on file   Tobacco Use   ? Smoking status: Never Smoker   ? Smokeless tobacco: Never Used   Substance and Sexual Activity   ? Alcohol use: Yes     Comment: rarely   ? Drug use: No   ? Sexual activity: Not on file   Lifestyle   ? Physical activity:     Days per week: Not on file     Minutes per session: Not on file   ? Stress: Not on file   Relationships   ? Social connections:     Talks on phone: Not on file     Gets together: Not on file     Attends Christianity service: Not on file     Active member of club or organization: Not on file     Attends meetings of clubs or organizations: Not on file     Relationship status: Not on file   ? Intimate partner violence:     Fear of current or ex partner: Not on file     Emotionally abused: Not on file     Physically abused: Not on file     Forced sexual activity: Not on file   Other Topics Concern   ? Not on file   Social History Narrative   ? Not on file          Medications  Allergies   Current Outpatient Medications   Medication Sig Dispense Refill   ? aspirin 81 mg chewable tablet Chew 81 mg daily.     ? cholecalciferol, vitamin D3, (VITAMIN D3) 1,000 unit capsule Take 1,000 Units by mouth daily.     ? diltiazem (CARDIZEM CD) 360 MG 24 hr capsule Take 360 mg by mouth every evening.      ? levETIRAcetam (KEPPRA) 750 MG tablet Take 750 mg by mouth 2 (two) times a day.      ? losartan (COZAAR) 25 MG tablet Take 1 tablet (25 mg total) by mouth daily. 90 tablet 3   ? sertraline (ZOLOFT) 50 MG tablet Take 50 mg by mouth daily.     ?  simvastatin (ZOCOR) 20 MG tablet Take 20 mg by mouth bedtime.     ? oxyCODONE (ROXICODONE) 5 MG immediate release tablet Take 5 mg by mouth every 4 (four) hours as needed for pain.     ? senna-docusate (PERICOLACE) 8.6-50 mg tablet Take 2 tablets by mouth 2 (two) times a day as needed for constipation. (Patient taking differently: Take 1 tablet by mouth 2 (two) times a day as needed for constipation. ) 40 tablet 0     No current facility-administered medications for this visit.       Allergies   Allergen Reactions   ? Diph,Pertuss(Acel),Tet Vac(Pf) Hives   ? Penicillins Hives   ? Cefuroxime Axetil Hives and Rash         Lab Results    Chemistry/lipid CBC Cardiac Enzymes/BNP/TSH/INR   Lab Results   Component Value Date    CHOL 168 12/20/2018    HDL 54 12/20/2018    LDLCALC 95 12/20/2018    TRIG 93 12/20/2018    CREATININE 0.69 12/20/2018    BUN 20 12/20/2018    K 4.2 12/20/2018     12/20/2018     12/20/2018    CO2 25 12/20/2018    Lab Results   Component Value Date    WBC 7.3 12/20/2018    HGB 14.3 12/20/2018    HCT 44.8 12/20/2018    MCV 93 12/20/2018     12/20/2018    Lab Results   Component Value Date    TSH 1.83 12/20/2018    INR 1.11 (H) 05/25/2017          Stacy Cardoso MD  [facility]

## 2024-05-16 ENCOUNTER — OFFICE VISIT (OUTPATIENT)
Dept: NEUROLOGY | Facility: CLINIC | Age: 84
End: 2024-05-16
Payer: COMMERCIAL

## 2024-05-16 ENCOUNTER — LAB (OUTPATIENT)
Dept: LAB | Facility: HOSPITAL | Age: 84
End: 2024-05-16
Payer: COMMERCIAL

## 2024-05-16 VITALS
BODY MASS INDEX: 25.52 KG/M2 | HEIGHT: 60 IN | SYSTOLIC BLOOD PRESSURE: 141 MMHG | WEIGHT: 130 LBS | DIASTOLIC BLOOD PRESSURE: 79 MMHG | HEART RATE: 70 BPM

## 2024-05-16 DIAGNOSIS — R56.9 SEIZURES (H): ICD-10-CM

## 2024-05-16 LAB
ANION GAP SERPL CALCULATED.3IONS-SCNC: 10 MMOL/L (ref 7–15)
BUN SERPL-MCNC: 20.7 MG/DL (ref 8–23)
CALCIUM SERPL-MCNC: 10.1 MG/DL (ref 8.8–10.2)
CHLORIDE SERPL-SCNC: 105 MMOL/L (ref 98–107)
CREAT SERPL-MCNC: 0.83 MG/DL (ref 0.51–0.95)
DEPRECATED HCO3 PLAS-SCNC: 27 MMOL/L (ref 22–29)
EGFRCR SERPLBLD CKD-EPI 2021: 70 ML/MIN/1.73M2
GLUCOSE SERPL-MCNC: 100 MG/DL (ref 70–99)
POTASSIUM SERPL-SCNC: 4.2 MMOL/L (ref 3.4–5.3)
SODIUM SERPL-SCNC: 142 MMOL/L (ref 135–145)

## 2024-05-16 PROCEDURE — 80177 DRUG SCRN QUAN LEVETIRACETAM: CPT

## 2024-05-16 PROCEDURE — 99213 OFFICE O/P EST LOW 20 MIN: CPT

## 2024-05-16 PROCEDURE — 80048 BASIC METABOLIC PNL TOTAL CA: CPT

## 2024-05-16 PROCEDURE — 36415 COLL VENOUS BLD VENIPUNCTURE: CPT

## 2024-05-16 RX ORDER — LEVETIRACETAM 500 MG/1
500 TABLET ORAL DAILY
Qty: 90 TABLET | Refills: 3 | Status: SHIPPED | OUTPATIENT
Start: 2024-05-16

## 2024-05-16 RX ORDER — LEVETIRACETAM 750 MG/1
750 TABLET ORAL DAILY
Qty: 90 TABLET | Refills: 3 | Status: SHIPPED | OUTPATIENT
Start: 2024-05-16

## 2024-05-16 NOTE — PATIENT INSTRUCTIONS
Plan:  --- Continue Keppra 750 mg in the morning and 500 mg at night  --- Labs: Keppra and BMP  --- Take your medications as prescribed, and do not stop taking abruptly.  --- Try to take your anti-seizure medications at the same time every day  --- Avoid common triggers: sleep deprivation, excessive alcohol, stress, flashing lights or patterns, dehydration, recreational drug use, illness and missed medications.  --- Plan on follow up in the Neurology Clinic in 12 months.  --- Please feel free to reach out if you have any further questions or concerns.  --- Seek immediate medical attention if an emergency arises or if your health becomes progressively worse.     It was a pleasure to see you today!

## 2024-05-16 NOTE — NURSING NOTE
Chief Complaint   Patient presents with    Seizures     Patient has no concerns     Britni Hoyos on 5/16/2024 at 10:42 AM

## 2024-05-16 NOTE — LETTER
5/16/2024         RE: Silvia Solomon  [address] [address]        Dear Colleague,    Thank you for referring your patient, Silvia Solomon, to the [facility] NEUR[facility]IC [place]. Please see a copy of my visit note below.      __________________________________  ESTABLISHED PATIENT NEUROLOGY NOTE    DATE OF VISIT: 5/16/2024  MRN: [id number]  PATIENT NAME: Silvia Solomon  YOB: 1940    Chief Complaint   Patient presents with     Seizures     Patient has no concerns     SUBJECTIVE:                                                      HISTORY OF PRESENT ILLNESS:  Silvia is here for follow up regarding seizures    Silvia Solomon is a 83 year old female who is followed in the clinic for epilepsy by Dr. Mack, last seen 4/28/2023.  Per chart review, seizures started when the patient was in her 20s around the time of the birth of one of her children.  She was started on Depakote but then stopped it for a while but had recurrence of seizures so she was put back on the medication.  This provided good control but she developed hair loss in 2016 so she was switched to Keppra.  She did have some breakthrough seizures in 2016 and 2017, in the setting of stress.  There was a possibility she had missed some doses of her Keppra.  Dr. Louise increased the dose to 750mg twice daily, and she has been seizure-free since.  Seizures described as convulsive episodes of loss of consciousness     05/16/24 : Silvia presents to the clinic today for annual seizure follow-up.  She is accompanied by her , Kenny.  Silvia denies any seizure activity in over 6 years.  She states that she has an aura of feeling confused prior to a generalized tonic-clonic seizure.  She does have a few episodes per year of confusion, this does not transition into a generalized tonic-clonic seizure.  She denies any loss of consciousness, zoning out or staring spells.  No involuntary muscle movement.  No periods of  unexplained time loss.  She is taking her medication as prescribed and tolerating well without adverse effects.     Current Anti-Seizure Medications:    Keppra 500/750 mg     Perceived AED Side Effects: No    Medication Notes:   AED Medication Compliance:  compliant      Psycho-Social History: Silvia Solomon currently lives [place] with spouse Kenny. Highest level of education HS. Employment status: retired     Patient does not smoke, alcohol use - wine weekly, no recreational drug use.  Currently, patient denies feeling depressed, denies feeling anhedonia, denies suicidal  thoughts, and denies having feelings of excessive guilt/worthlessness.  We reviewed importance of mental and emotional wellbeing and impact on health.      Date of last seizure: over 6 years ago   Driving:  Currently patient is:  Not driving.     Explained driving restrictions as per state law. No driving until approved by appropriate state licensing authorities. It is a licensed 's responsibility to be aware of and comply with laws and regulations regarding driving privileges and loss of awareness or alteration in ability to maintain control of a motor vehicle.   Current Medications:   Current Outpatient Medications   Medication Sig Dispense Refill     aspirin (ASA) 81 MG chewable tablet Take 81 mg by mouth daily       Calcium Carbonate (CALCIUM 600 PO)        cholecalciferol (VITAMIN D3) 25 mcg (1000 units) capsule Take 1,000 Units by mouth daily       diltiazem ER (TIAZAC) 360 MG 24 hr ER beaded capsule Take 360 mg by mouth every evening       escitalopram (LEXAPRO) 10 MG tablet 1 tablet Orally Once a day for anxiety       levETIRAcetam (KEPPRA) 500 MG tablet Take 1 tablet (500 mg) by mouth daily Take in the evening. 90 tablet 3     levETIRAcetam (KEPPRA) 750 MG tablet Take 1 tablet (750 mg) by mouth daily Take in the morning. 90 tablet 3     losartan (COZAAR) 50 MG tablet Take 1 tablet by mouth daily       nitroFURantoin  macrocrystal (MACRODANTIN) 100 MG capsule        simvastatin (ZOCOR) 20 MG tablet Take 20 mg by mouth At Bedtime       clindamycin (CLEOCIN) 300 MG capsule Take 1 capsule by mouth as needed Takes prior to dental appts (Patient not taking: Reported on 5/16/2024)       No current facility-administered medications for this visit.     Past Medical History:   Patient  has a past medical history of Arthritis, Depression, Dyslipidemia, Epileptic seizures (H), Essential hypertension, History of transfusion, Hyperlipidemia, Hypertension, Mitral regurgitation (03/23/2012), Pancreatitis (04/2017), Postoperative anemia due to acute blood loss (05/26/2017), Seizures (H), and Status post right knee replacement (05/24/2017).  Surgical History:  She  has a past surgical history that includes appendectomy; Tonsillectomy; Gallbladder surgery; Arthroscopy knee (Right); Dilation and curettage; Cholecystectomy; Esophagoscopy, gastroscopy, duodenoscopy (EGD), combined (N/A, 5/12/2017); TOTAL KNEE ARTHROPLASTY (Right, 5/24/2017); and Endoscopic retrograde cholangiopancreatogram (N/A, 6/30/2017).  Family and Social History:  Reviewed, and she  reports that she has never smoked. She has never used smokeless tobacco. She reports current alcohol use. She reports that she does not use drugs.  Reviewed, and family history includes Cerebrovascular Disease in her mother; Colon Cancer in her brother; Heart Disease (age of onset: 87.00) in her brother; Hypertension in her father and mother; No Known Problems in her daughter, father, son, and son; Suicidality (age of onset: 31.00) in her son.    RECENT DIAGNOSTIC STUDIES:   Labs:07/24/23  Keppra (Levetiracetam) Level  10.0 - 40.0  g/mL 40.6 High      Imaging: no recent     REVIEW OF SYSTEMS:                                                      10-point review of systems is negative except as mentioned above in HPI.    EXAM:                                                      Physical Exam:    Vitals: BP (!) 141/79   Pulse 70   Ht 1.524 m (5')   Wt 59 kg (130 lb)   LMP  (LMP Unknown)   BMI 25.39 kg/m    BMI= Body mass index is 25.39 kg/m .  GENERAL: NAD.  HEENT: NC/AT.  PULM: Non-labored breathing.   Neurologic:  MENTAL STATUS: Alert, attentive. Speech is fluent. Normal comprehension. Normal concentration. Adequate fund of knowledge.   CRANIAL NERVES: Visual fields intact to confrontation. Pupils equally, round and reactive to light. Facial sensation and movement normal. EOM full. Hearing intact to conversation. Trapezius strength intact. Palate moves symmetrically. Tongue midline.  MOTOR: 5/5 in proximal and distal muscle groups of upper and lower extremities. Tone and bulk normal.   SENSATION: Normal light touch throughout.   COORDINATION: Normal finger nose finger. Finger tapping normal. Knee heel shin normal.  STATION AND GAIT: Romberg Negative. Good postural reflexes. Casual gait Normal  right hand-dominant.      ASSESSMENT and PLAN:                                                      Assessment:    ICD-10-CM    1. Seizures (H)  R56.9 Keppra (Levetiracetam) Level     Basic metabolic panel     levETIRAcetam (KEPPRA) 750 MG tablet     levETIRAcetam (KEPPRA) 500 MG tablet          Silvia Solomon is a 83 year old female who is followed in the clinic for epilepsy by Dr. Mack, last seen 4/28/2023. Patient has remained seizure free on current treatment plan of Keppra 750/500. I will order labs to monitor the drug parameters. We discussed interventions, will plan to see the patient back in 12 months.  Silvia understands and agrees with this plan.     Plan:  --- Continue Keppra 750 mg in the morning and 500 mg at night  --- Labs: Keppra and BMP  --- Take your medications as prescribed, and do not stop taking abruptly.  --- Try to take your anti-seizure medications at the same time every day  --- Avoid common triggers: sleep deprivation, excessive alcohol, stress, flashing lights or patterns,  dehydration, recreational drug use, illness and missed medications.  --- Plan on follow up in the Neurology Clinic in 12 months.  --- Please feel free to reach out if you have any further questions or concerns.  --- Seek immediate medical attention if an emergency arises or if your health becomes progressively worse.     It was a pleasure to see you today!     Total Time: Total time spent for face to face visit, reviewing labs/imaging studies, counseling and coordination of care was: 20 Minutes spent on the date of the encounter doing chart review, review of outside records, review of test results, interpretation of tests, patient visit, documentation, and discussion with family     This note was dictated using voice recognition software.  Any grammatical or context distortions are unintentional and inherent to the software.    Elissa Boucher, SIRI, APRN, CNP  [facility]                     Again, thank you for allowing me to participate in the care of your patient.        Sincerely,        HEIDE Arciniega CNP

## 2024-05-16 NOTE — PROGRESS NOTES
__________________________________  ESTABLISHED PATIENT NEUROLOGY NOTE    DATE OF VISIT: 5/16/2024  MRN: [id number]  PATIENT NAME: Silvia Solomon  YOB: 1940    Chief Complaint   Patient presents with    Seizures     Patient has no concerns     SUBJECTIVE:                                                      HISTORY OF PRESENT ILLNESS:  Silvia is here for follow up regarding seizures    Silvia Solomon is a 83 year old female who is followed in the clinic for epilepsy by Dr. Mack, last seen 4/28/2023.  Per chart review, seizures started when the patient was in her 20s around the time of the birth of one of her children.  She was started on Depakote but then stopped it for a while but had recurrence of seizures so she was put back on the medication.  This provided good control but she developed hair loss in 2016 so she was switched to Keppra.  She did have some breakthrough seizures in 2016 and 2017, in the setting of stress.  There was a possibility she had missed some doses of her Keppra.  Dr. Louise increased the dose to 750mg twice daily, and she has been seizure-free since.  Seizures described as convulsive episodes of loss of consciousness     05/16/24 : Silvia presents to the clinic today for annual seizure follow-up.  She is accompanied by her , Kenny.  Silvia denies any seizure activity in over 6 years.  She states that she has an aura of feeling confused prior to a generalized tonic-clonic seizure.  She does have a few episodes per year of confusion, this does not transition into a generalized tonic-clonic seizure.  She denies any loss of consciousness, zoning out or staring spells.  No involuntary muscle movement.  No periods of unexplained time loss.  She is taking her medication as prescribed and tolerating well without adverse effects.     Current Anti-Seizure Medications:    Keppra 500/750 mg     Perceived AED Side Effects: No    Medication Notes:   AED Medication Compliance:   compliant      Psycho-Social History: Silvia Solomon currently lives [place] with spouse Kenny. Highest level of education HS. Employment status: retired     Patient does not smoke, alcohol use - wine weekly, no recreational drug use.  Currently, patient denies feeling depressed, denies feeling anhedonia, denies suicidal  thoughts, and denies having feelings of excessive guilt/worthlessness.  We reviewed importance of mental and emotional wellbeing and impact on health.      Date of last seizure: over 6 years ago   Driving:  Currently patient is:  Not driving.     Explained driving restrictions as per state law. No driving until approved by appropriate state licensing authorities. It is a licensed 's responsibility to be aware of and comply with laws and regulations regarding driving privileges and loss of awareness or alteration in ability to maintain control of a motor vehicle.   Current Medications:   Current Outpatient Medications   Medication Sig Dispense Refill    aspirin (ASA) 81 MG chewable tablet Take 81 mg by mouth daily      Calcium Carbonate (CALCIUM 600 PO)       cholecalciferol (VITAMIN D3) 25 mcg (1000 units) capsule Take 1,000 Units by mouth daily      diltiazem ER (TIAZAC) 360 MG 24 hr ER beaded capsule Take 360 mg by mouth every evening      escitalopram (LEXAPRO) 10 MG tablet 1 tablet Orally Once a day for anxiety      levETIRAcetam (KEPPRA) 500 MG tablet Take 1 tablet (500 mg) by mouth daily Take in the evening. 90 tablet 3    levETIRAcetam (KEPPRA) 750 MG tablet Take 1 tablet (750 mg) by mouth daily Take in the morning. 90 tablet 3    losartan (COZAAR) 50 MG tablet Take 1 tablet by mouth daily      nitroFURantoin macrocrystal (MACRODANTIN) 100 MG capsule       simvastatin (ZOCOR) 20 MG tablet Take 20 mg by mouth At Bedtime      clindamycin (CLEOCIN) 300 MG capsule Take 1 capsule by mouth as needed Takes prior to dental appts (Patient not taking: Reported on 5/16/2024)       No  current facility-administered medications for this visit.     Past Medical History:   Patient  has a past medical history of Arthritis, Depression, Dyslipidemia, Epileptic seizures (H), Essential hypertension, History of transfusion, Hyperlipidemia, Hypertension, Mitral regurgitation (03/23/2012), Pancreatitis (04/2017), Postoperative anemia due to acute blood loss (05/26/2017), Seizures (H), and Status post right knee replacement (05/24/2017).  Surgical History:  She  has a past surgical history that includes appendectomy; Tonsillectomy; Gallbladder surgery; Arthroscopy knee (Right); Dilation and curettage; Cholecystectomy; Esophagoscopy, gastroscopy, duodenoscopy (EGD), combined (N/A, 5/12/2017); TOTAL KNEE ARTHROPLASTY (Right, 5/24/2017); and Endoscopic retrograde cholangiopancreatogram (N/A, 6/30/2017).  Family and Social History:  Reviewed, and she  reports that she has never smoked. She has never used smokeless tobacco. She reports current alcohol use. She reports that she does not use drugs.  Reviewed, and family history includes Cerebrovascular Disease in her mother; Colon Cancer in her brother; Heart Disease (age of onset: 87.00) in her brother; Hypertension in her father and mother; No Known Problems in her daughter, father, son, and son; Suicidality (age of onset: 31.00) in her son.    RECENT DIAGNOSTIC STUDIES:   Labs:07/24/23  Keppra (Levetiracetam) Level  10.0 - 40.0  g/mL 40.6 High      Imaging: no recent     REVIEW OF SYSTEMS:                                                      10-point review of systems is negative except as mentioned above in HPI.    EXAM:                                                      Physical Exam:   Vitals: BP (!) 141/79   Pulse 70   Ht 1.524 m (5')   Wt 59 kg (130 lb)   LMP  (LMP Unknown)   BMI 25.39 kg/m    BMI= Body mass index is 25.39 kg/m .  GENERAL: NAD.  HEENT: NC/AT.  PULM: Non-labored breathing.   Neurologic:  MENTAL STATUS: Alert, attentive. Speech is  fluent. Normal comprehension. Normal concentration. Adequate fund of knowledge.   CRANIAL NERVES: Visual fields intact to confrontation. Pupils equally, round and reactive to light. Facial sensation and movement normal. EOM full. Hearing intact to conversation. Trapezius strength intact. Palate moves symmetrically. Tongue midline.  MOTOR: 5/5 in proximal and distal muscle groups of upper and lower extremities. Tone and bulk normal.   SENSATION: Normal light touch throughout.   COORDINATION: Normal finger nose finger. Finger tapping normal. Knee heel shin normal.  STATION AND GAIT: Romberg Negative. Good postural reflexes. Casual gait Normal  right hand-dominant.      ASSESSMENT and PLAN:                                                      Assessment:    ICD-10-CM    1. Seizures (H)  R56.9 Keppra (Levetiracetam) Level     Basic metabolic panel     levETIRAcetam (KEPPRA) 750 MG tablet     levETIRAcetam (KEPPRA) 500 MG tablet          Silvia Solomon is a 83 year old female who is followed in the clinic for epilepsy by Dr. Mack, last seen 4/28/2023. Patient has remained seizure free on current treatment plan of Keppra 750/500. I will order labs to monitor the drug parameters. We discussed interventions, will plan to see the patient back in 12 months.  Silvia understands and agrees with this plan.     Plan:  --- Continue Keppra 750 mg in the morning and 500 mg at night  --- Labs: Keppra and BMP  --- Take your medications as prescribed, and do not stop taking abruptly.  --- Try to take your anti-seizure medications at the same time every day  --- Avoid common triggers: sleep deprivation, excessive alcohol, stress, flashing lights or patterns, dehydration, recreational drug use, illness and missed medications.  --- Plan on follow up in the Neurology Clinic in 12 months.  --- Please feel free to reach out if you have any further questions or concerns.  --- Seek immediate medical attention if an emergency arises or if your  health becomes progressively worse.     It was a pleasure to see you today!     Total Time: Total time spent for face to face visit, reviewing labs/imaging studies, counseling and coordination of care was: 20 Minutes spent on the date of the encounter doing chart review, review of outside records, review of test results, interpretation of tests, patient visit, documentation, and discussion with family     This note was dictated using voice recognition software.  Any grammatical or context distortions are unintentional and inherent to the software.    Elissa Boucher, DNP, APRN, CNP  [facility]

## 2024-05-17 LAB — LEVETIRACETAM SERPL-MCNC: 46 ΜG/ML (ref 10–40)

## 2024-10-28 ENCOUNTER — TELEPHONE (OUTPATIENT)
Dept: CARDIOLOGY | Facility: CLINIC | Age: 84
End: 2024-10-28
Payer: COMMERCIAL

## 2024-10-28 DIAGNOSIS — I34.1 MVP (MITRAL VALVE PROLAPSE): Primary | ICD-10-CM

## 2024-10-28 DIAGNOSIS — I34.0 NONRHEUMATIC MITRAL VALVE REGURGITATION: ICD-10-CM

## 2024-10-28 NOTE — TELEPHONE ENCOUNTER
[facility] Call Center    Phone Message    May a detailed message be left on voicemail: yes     Reason for Call: Other: Patient calling to schedule echo and follow up with Dr. Cardoso - echo is . Can someone extend echo order and call patient back to coordinate echo with follow up?      Action Taken: Message routed to:  Other: Cardio    Travel Screening: Not Applicable     Date of Service:                                                  Thank you!  [facility]

## 2024-10-29 NOTE — TELEPHONE ENCOUNTER
Per Dr. Cardoso's 10-26-23 visit note:  Plan:  1.  Repeat echocardiogram in 1 year  2.  Follow-up in 1 year    New order placed per protocol - msg sent to sched team with request to arrange echo 1-2wks prior to yrly follow-up.  mg

## 2024-11-13 ENCOUNTER — TRANSFERRED RECORDS (OUTPATIENT)
Dept: HEALTH INFORMATION MANAGEMENT | Facility: CLINIC | Age: 84
End: 2024-11-13

## 2024-11-13 ENCOUNTER — LAB REQUISITION (OUTPATIENT)
Dept: LAB | Facility: CLINIC | Age: 84
End: 2024-11-13

## 2024-11-13 DIAGNOSIS — E55.9 VITAMIN D DEFICIENCY, UNSPECIFIED: ICD-10-CM

## 2024-11-13 DIAGNOSIS — E78.5 HYPERLIPIDEMIA, UNSPECIFIED: ICD-10-CM

## 2024-11-13 LAB
ALBUMIN SERPL BCG-MCNC: 4.3 G/DL (ref 3.5–5.2)
ALP SERPL-CCNC: 85 U/L (ref 40–150)
ALT SERPL W P-5'-P-CCNC: 13 U/L (ref 0–50)
ANION GAP SERPL CALCULATED.3IONS-SCNC: 12 MMOL/L (ref 7–15)
AST SERPL W P-5'-P-CCNC: 17 U/L (ref 0–45)
BILIRUB SERPL-MCNC: 0.5 MG/DL
BUN SERPL-MCNC: 19.1 MG/DL (ref 8–23)
CALCIUM SERPL-MCNC: 9.7 MG/DL (ref 8.8–10.4)
CHLORIDE SERPL-SCNC: 104 MMOL/L (ref 98–107)
CHOLEST SERPL-MCNC: 171 MG/DL
CREAT SERPL-MCNC: 0.72 MG/DL (ref 0.51–0.95)
EGFRCR SERPLBLD CKD-EPI 2021: 84 ML/MIN/1.73M2
FASTING STATUS PATIENT QL REPORTED: ABNORMAL
FASTING STATUS PATIENT QL REPORTED: NORMAL
GLUCOSE SERPL-MCNC: 100 MG/DL (ref 70–99)
HCO3 SERPL-SCNC: 25 MMOL/L (ref 22–29)
HDLC SERPL-MCNC: 59 MG/DL
LDLC SERPL CALC-MCNC: 91 MG/DL
NONHDLC SERPL-MCNC: 112 MG/DL
POTASSIUM SERPL-SCNC: 3.9 MMOL/L (ref 3.4–5.3)
PROT SERPL-MCNC: 6.7 G/DL (ref 6.4–8.3)
SODIUM SERPL-SCNC: 141 MMOL/L (ref 135–145)
TRIGL SERPL-MCNC: 103 MG/DL
VIT D+METAB SERPL-MCNC: 59 NG/ML (ref 20–50)

## 2024-11-13 PROCEDURE — 82465 ASSAY BLD/SERUM CHOLESTEROL: CPT | Performed by: FAMILY MEDICINE

## 2024-11-13 PROCEDURE — 82947 ASSAY GLUCOSE BLOOD QUANT: CPT | Performed by: FAMILY MEDICINE

## 2024-11-13 PROCEDURE — 82306 VITAMIN D 25 HYDROXY: CPT | Performed by: FAMILY MEDICINE

## 2024-11-17 NOTE — NURSING NOTE
Chief Complaint   Patient presents with     Follow Up     Annual follow up-med refills needed      Telephone visit [phone]  Anibal Ibrahim CMA on 11/13/2020 at 7:51 AM         Patient is a 64y old  Male who presents with a chief complaint of painless jaundice (17 Nov 2024 13:08)      INTERVAL HPI/OVERNIGHT EVENTS: no events noted overnight. Patient was evaluated feels well.     MEDICATIONS  (STANDING):  amLODIPine   Tablet 10 milliGRAM(s) Oral daily  enoxaparin Injectable 40 milliGRAM(s) SubCutaneous every 24 hours  folic acid 1 milliGRAM(s) Oral daily  influenza   Vaccine 0.5 milliLiter(s) IntraMuscular once  insulin glargine Injectable (LANTUS) 10 Unit(s) SubCutaneous at bedtime  insulin lispro (ADMELOG) corrective regimen sliding scale   SubCutaneous three times a day before meals  insulin lispro (ADMELOG) corrective regimen sliding scale   SubCutaneous at bedtime  insulin lispro Injectable (ADMELOG) 4 Unit(s) SubCutaneous three times a day before meals  multivitamin 1 Tablet(s) Oral daily  pantoprazole    Tablet 40 milliGRAM(s) Oral before breakfast  prednisoLONE    3 mG/mL Solution (ORAPRED) 30 milliGRAM(s) Oral daily  thiamine 100 milliGRAM(s) Oral daily    MEDICATIONS  (PRN):  aluminum hydroxide/magnesium hydroxide/simethicone Suspension 30 milliLiter(s) Oral every 6 hours PRN Dyspepsia      __________________________________________________  REVIEW OF SYSTEMS:    CONSTITUTIONAL: No fever,   EYES: no acute visual disturbances  NECK: No pain or stiffness  RESPIRATORY: No cough; No shortness of breath  CARDIOVASCULAR: No chest pain, no palpitations  GASTROINTESTINAL: No pain. No nausea or vomiting; No diarrhea   NEUROLOGICAL: No headache or numbness, no tremors  MUSCULOSKELETAL: No joint pain, no muscle pain  GENITOURINARY: no dysuria, no frequency, no hesitancy  PSYCHIATRY: no depression , no anxiety  ALL OTHER  ROS negative        Vital Signs Last 24 Hrs  T(C): 36.7 (17 Nov 2024 05:00), Max: 36.7 (17 Nov 2024 05:00)  T(F): 98 (17 Nov 2024 05:00), Max: 98 (17 Nov 2024 05:00)  HR: 81 (17 Nov 2024 05:00) (81 - 87)  BP: 130/72 (17 Nov 2024 05:00) (130/72 - 143/72)  BP(mean): 91 (17 Nov 2024 05:00) (91 - 91)  RR: 18 (17 Nov 2024 05:00) (18 - 18)  SpO2: 99% (17 Nov 2024 05:00) (98% - 99%)    Parameters below as of 17 Nov 2024 05:00  Patient On (Oxygen Delivery Method): room air        ________________________________________________  PHYSICAL EXAM:  GENERAL: NAD  HEENT: Normocephalic;  conjunctivae and sclerae icteric ; moist mucous membranes;   NECK : supple  CHEST/LUNG: Clear to auscultation bilaterally with good air entry   HEART: S1 S2  regular; no murmurs, gallops or rubs  ABDOMEN: Soft, Nontender, Nondistended; Bowel sounds present  EXTREMITIES: no cyanosis; no edema; no calf tenderness  SKIN: warm and dry; no rash  NERVOUS SYSTEM:  Awake and alert; Oriented  to place, person and time ; no new deficits    _________________________________________________  LABS:                        9.5    12.54 )-----------( 265      ( 17 Nov 2024 06:23 )             26.8     11-17    133[L]  |  102  |  24[H]  ----------------------------<  169[H]  4.7   |  23  |  1.09    Ca    9.2      17 Nov 2024 06:23    TPro  7.2  /  Alb  2.1[L]  /  TBili  11.0[H]  /  DBili  x   /  AST  135[H]  /  ALT  95[H]  /  AlkPhos  131[H]  11-17      Urinalysis Basic - ( 17 Nov 2024 06:23 )    Color: x / Appearance: x / SG: x / pH: x  Gluc: 169 mg/dL / Ketone: x  / Bili: x / Urobili: x   Blood: x / Protein: x / Nitrite: x   Leuk Esterase: x / RBC: x / WBC x   Sq Epi: x / Non Sq Epi: x / Bacteria: x      CAPILLARY BLOOD GLUCOSE      POCT Blood Glucose.: 319 mg/dL (17 Nov 2024 11:23)  POCT Blood Glucose.: 203 mg/dL (17 Nov 2024 07:45)  POCT Blood Glucose.: 258 mg/dL (16 Nov 2024 21:10)  POCT Blood Glucose.: 358 mg/dL (16 Nov 2024 16:48)        RADIOLOGY & ADDITIONAL TESTS:    Imaging Personally Reviewed:  YES    Consultant(s) Notes Reviewed:   YES    Care Discussed with Consultants : YES     Plan of care was discussed with patient and /or primary care giver; all questions and concerns were addressed and care was aligned with patient's wishes.

## 2024-11-20 ENCOUNTER — TRANSFERRED RECORDS (OUTPATIENT)
Dept: HEALTH INFORMATION MANAGEMENT | Facility: CLINIC | Age: 84
End: 2024-11-20

## 2025-01-13 ENCOUNTER — HOSPITAL ENCOUNTER (OUTPATIENT)
Dept: CARDIOLOGY | Facility: HOSPITAL | Age: 85
Discharge: HOME | End: 2025-01-13
Attending: INTERNAL MEDICINE | Admitting: INTERNAL MEDICINE
Payer: COMMERCIAL

## 2025-01-13 DIAGNOSIS — I34.1 MVP (MITRAL VALVE PROLAPSE): ICD-10-CM

## 2025-01-13 DIAGNOSIS — I34.0 NONRHEUMATIC MITRAL VALVE REGURGITATION: ICD-10-CM

## 2025-01-13 LAB — LVEF ECHO: NORMAL

## 2025-01-13 PROCEDURE — 93306 TTE W/DOPPLER COMPLETE: CPT | Mod: 26 | Performed by: INTERNAL MEDICINE

## 2025-01-13 PROCEDURE — 93306 TTE W/DOPPLER COMPLETE: CPT

## 2025-01-20 NOTE — PROGRESS NOTES
HEART CARE ENCOUNTER NOTE      [facility]  [phone]      Assessment/Recommendations   Assessment:   Mitral regurgitation: Most recent echocardiogram 1/13/2025 showed this to be mild to moderate with mild prolapse.  Patient denies any symptoms.  Hypertension: On losartan 50 mg daily, diltiazem 360 mg daily.  Elevated in clinic today 156/72.  Patient notes having whitecoat hypertension and always being elevated at doctor appointments.  She monitors at home and is typically 130 systolic.  PVCs: Holter monitor showed 0.7% burden.  On diltiazem 360 mg daily.  Patient denies any palpitations.      Plan:   Continue current medications  Repeat echocardiogram in 1 year      Follow up in 1 year with Dr. Cardoso     History of Present Illness/Subjective    HPI: Silvia Solomon is a 84 year old female with PMHx of history of mitral valve prolapse and mitral regurgitation as well as hypertension presents for follow-up.  Patient last saw Dr. Cardoso 10/26/2023 where patient was doing well. Was planning to start walking more with her .  Echocardiogram was recently obtained 1/13/2025 that showed normal ejection fraction 60 to 65% with mild to moderate mitral regurgitation and mild prolapse.  No significant change from prior.    Patient notes she has been doing well denies any major cardiac concerns.  Tries going for walks denying any exertional angina or dyspnea.  Patient watches her 8-month-old granddaughter twice a week and notes this keeps her active and busy.  Denies palpitations, lower extremity edema, lightheadedness.        Echocardiogram 1/13/2025 results:  The left ventricle is normal in size.  Left ventricular function is normal.The ejection fraction is 60-65%.  Diastolic Doppler findings (E/E' ratio and/or other parameters) suggest left  ventricular filling pressures are increased.  Normal right ventricle size and systolic function.  The left atrium is moderately dilated.  There is mild to  moderate (1-2+) mitral regurgitation. There is mild prolapse  of the posterior mitral leaflet.  Compared to prior study, there is no significant change.    Holter monitor 12/14/2020  1. Baseline rhythm: Normal sinus rhythm with normal echocardiographic intervals; average heart rate 76 bpm with heart rate ranging between  bpm.       2. Longest pause: No pauses present.      3. QRS morphology: Normal QRS duration     4. Supraventricular ectopy:   26 PACs MI: No atrial tachycardia or atrial fibrillation .      5. Ventricular ectopy:            791 PVC,  burden 0.7% %, PVCs are all singular with no repetitive forms or complexity     6. Atrial fibrillation/flutter:      None     7. Symptomatic transmissions:  8. 3 symptomatic transmissions recorded, patient noted palpitations  9. This seemed associated with singular PVCs     Physical Examination  Review of Systems   Vitals: BP (!) 156/72 (BP Location: Left arm, Patient Position: Sitting, Cuff Size: Adult Regular)   Pulse 72   Resp 16   Ht 1.524 m (5')   Wt 56.2 kg (124 lb)   LMP  (LMP Unknown)   BMI 24.22 kg/m    BMI= Body mass index is 24.22 kg/m .  Wt Readings from Last 3 Encounters:   01/21/25 56.2 kg (124 lb)   05/16/24 59 kg (130 lb)   10/26/23 58.5 kg (128 lb 14.4 oz)           ENT/Mouth: membranes moist, no oral lesions or bleeding gums.      EYES:  no scleral icterus, normal conjunctivae                    Neck: No carotid bruit   Chest/Lungs:   lungs are clear to auscultation, no rales or wheezing,  equal chest wall expansion    Cardiovascular:   Regular. Normal first and second heart sounds with holosystolic murmur, rubs, or gallops; the carotid, radial and posterior tibial pulses are intact, no edema bilaterally        Extremities: no cyanosis or clubbing   Skin: no xanthelasma, warm.    Neurologic: no tremors     Psychiatric: alert and oriented x3, calm        Please refer above for cardiac ROS details.        Medical History  Surgical History  Family History Social History   Past Medical History:   Diagnosis Date    Arthritis     Depression     Dyslipidemia     Epileptic seizures (H)     Essential hypertension     History of transfusion     pt states many years ago    Hyperlipidemia     Hypertension     Mitral regurgitation 2012    Pancreatitis 2017    Postoperative anemia due to acute blood loss 2017    Seizures (H)     last seizure activity Dec 2016    Status post right knee replacement 2017     Past Surgical History:   Procedure Laterality Date    APPENDECTOMY      ARTHROSCOPY KNEE Right     CHOLECYSTECTOMY      DILATION AND CURETTAGE      ENDOSCOPIC RETROGRADE CHOLANGIOPANCREATOGRAM N/A 2017    Procedure: ENDOSCOPIC RETROGRADE CHOLANGIOPANCREATOGRAPHY;  Surgeon: Tomer Bob MD;  Location: [facility] [place];  Service:     ESOPHAGOSCOPY, GASTROSCOPY, DUODENOSCOPY (EGD), COMBINED N/A 2017    Procedure: ENDOSCOPIC ULTRASOUND;  Surgeon: Tomer Bob MD;  Location: [facility] [place];  Service:     GALLBLADDER SURGERY      TONSILLECTOMY      ZZC TOTAL KNEE ARTHROPLASTY Right 2017    Procedure: RIGHT TOTAL KNEE ARTHROPLASTY;  Surgeon: Mukesh Palencia MD;  [facility];  Service: Orthopedics     Family History   Problem Relation Age of Onset    Cerebrovascular Disease Mother     No Known Problems Father     Heart Disease Brother 87.00        he  at home, no autopsy    Colon Cancer Brother     Hypertension Mother     Hypertension Father     No Known Problems Son     Suicidality Son 31.00        cause of death    No Known Problems Son     No Known Problems Daughter     Breast Cancer No family hx of         Social History     Socioeconomic History    Marital status:      Spouse name: Not on file    Number of children: 4    Years of education: Not on file    Highest education level: Not on file   Occupational History    Not on file   Tobacco Use    Smoking status: Never    Smokeless tobacco:  Never   Vaping Use    Vaping status: Never Used   Substance and Sexual Activity    Alcohol use: Yes     Comment: Alcoholic Drinks/day: rarely (wine)    Drug use: No    Sexual activity: Not Currently     Partners: Male   Other Topics Concern    Parent/sibling w/ CABG, MI or angioplasty before 65F 55M? No   Social History Narrative    She walks for 30-35 minutes a week.     Social Drivers of Health     Financial Resource Strain: Not on file   Food Insecurity: Not on file   Transportation Needs: Not on file   Physical Activity: Not on file   Stress: Not on file   Social Connections: Not on file   Interpersonal Safety: Not on file   Housing Stability: Not on file           Medications  Allergies   Current Outpatient Medications   Medication Sig Dispense Refill    aspirin (ASA) 81 MG chewable tablet Take 81 mg by mouth daily      Calcium Carbonate (CALCIUM 600 PO)       cholecalciferol (VITAMIN D3) 25 mcg (1000 units) capsule Take 1,000 Units by mouth daily      clindamycin (CLEOCIN) 300 MG capsule Take 1 capsule by mouth as needed Takes prior to dental appts (Patient not taking: Reported on 5/16/2024)      diltiazem ER (TIAZAC) 360 MG 24 hr ER beaded capsule Take 360 mg by mouth every evening      escitalopram (LEXAPRO) 10 MG tablet 1 tablet Orally Once a day for anxiety      levETIRAcetam (KEPPRA) 500 MG tablet Take 1 tablet (500 mg) by mouth daily Take in the evening. 90 tablet 3    levETIRAcetam (KEPPRA) 750 MG tablet Take 1 tablet (750 mg) by mouth daily Take in the morning. 90 tablet 3    losartan (COZAAR) 50 MG tablet Take 1 tablet by mouth daily      nitroFURantoin macrocrystal (MACRODANTIN) 100 MG capsule       simvastatin (ZOCOR) 20 MG tablet Take 20 mg by mouth At Bedtime         Allergies   Allergen Reactions    Tetanus Immune Globulin      Other Reaction(s): severe redness    Tetanus Toxoid     Cefuroxime Hives and Rash    Penicillins Hives and Rash          Lab Results    Chemistry/lipid CBC Cardiac  "Enzymes/BNP/TSH/INR   Recent Labs   Lab Test 11/13/24  0901   CHOL 171   HDL 59   LDL 91   TRIG 103     Recent Labs   Lab Test 11/13/24  0901 11/20/23  1052 11/17/22  1333   LDL 91 96 88     Recent Labs   Lab Test 11/13/24  0901      POTASSIUM 3.9   CHLORIDE 104   CO2 25   *   BUN 19.1   CR 0.72   GFRESTIMATED 84   VIMAL 9.7     Recent Labs   Lab Test 11/13/24  0901 05/16/24  1114 11/20/23  1052   CR 0.72 0.83 0.84     No results for input(s): \"A1C\" in the last 19385 hours.       Recent Labs   Lab Test 11/17/22  1333   WBC 7.1   HGB 14.5   HCT 43.3   MCV 91        Recent Labs   Lab Test 11/17/22  1333 11/15/21  0914 12/20/18  0839   HGB 14.5 14.4 14.3    No results for input(s): \"TROPONINI\" in the last 58083 hours.  No results for input(s): \"BNP\", \"NTBNPI\", \"NTBNP\" in the last 44579 hours.  Recent Labs   Lab Test 11/15/21  0914   TSH 1.88     No results for input(s): \"INR\" in the last 39577 hours.       Princess Schwab PA-C                                       "

## 2025-01-21 ENCOUNTER — OFFICE VISIT (OUTPATIENT)
Dept: CARDIOLOGY | Facility: CLINIC | Age: 85
End: 2025-01-21
Payer: COMMERCIAL

## 2025-01-21 VITALS
HEIGHT: 60 IN | HEART RATE: 72 BPM | BODY MASS INDEX: 24.35 KG/M2 | SYSTOLIC BLOOD PRESSURE: 156 MMHG | RESPIRATION RATE: 16 BRPM | WEIGHT: 124 LBS | DIASTOLIC BLOOD PRESSURE: 72 MMHG

## 2025-01-21 DIAGNOSIS — I08.0 MITRAL AND AORTIC VALVE DISEASE: ICD-10-CM

## 2025-01-21 DIAGNOSIS — I10 ESSENTIAL HYPERTENSION: Primary | ICD-10-CM

## 2025-01-21 DIAGNOSIS — E78.49 OTHER HYPERLIPIDEMIA: ICD-10-CM

## 2025-01-21 DIAGNOSIS — I34.0 MITRAL VALVE INSUFFICIENCY, UNSPECIFIED ETIOLOGY: ICD-10-CM

## 2025-01-21 PROCEDURE — G2211 COMPLEX E/M VISIT ADD ON: HCPCS | Performed by: STUDENT IN AN ORGANIZED HEALTH CARE EDUCATION/TRAINING PROGRAM

## 2025-01-21 PROCEDURE — 99214 OFFICE O/P EST MOD 30 MIN: CPT | Performed by: STUDENT IN AN ORGANIZED HEALTH CARE EDUCATION/TRAINING PROGRAM

## 2025-01-21 NOTE — PATIENT INSTRUCTIONS
Silvia Solomon,    It was a pleasure to see you today at the [facility].     My recommendations after this visit include:    - Continue current medications.   - Follow up with Dr. Cardoso in 1 year, sooner if needed    - Please call [phone], if you have any questions or concerns      Princess Schwab PA-C

## 2025-01-21 NOTE — LETTER
1/21/2025    Marlen Quarles MD  [address]  [place] [address]    RE: Silvia Solomon       Dear Colleague,     I had the pleasure of seeing Silvia Solomon in the [facility].    HEART CARE ENCOUNTER NOTE      [facility]  [phone]      Assessment/Recommendations   Assessment:   Mitral regurgitation: Most recent echocardiogram 1/13/2025 showed this to be mild to moderate with mild prolapse.  Patient denies any symptoms.  Hypertension: On losartan 50 mg daily, diltiazem 360 mg daily.  Elevated in clinic today 156/72.  Patient notes having whitecoat hypertension and always being elevated at doctor appointments.  She monitors at home and is typically 130 systolic.  PVCs: Holter monitor showed 0.7% burden.  On diltiazem 360 mg daily.  Patient denies any palpitations.      Plan:   Continue current medications  Repeat echocardiogram in 1 year      Follow up in 1 year with Dr. Cardoso     History of Present Illness/Subjective    HPI: Silvia Solomon is a 84 year old female with PMHx of history of mitral valve prolapse and mitral regurgitation as well as hypertension presents for follow-up.  Patient last saw Dr. Cardoso 10/26/2023 where patient was doing well. Was planning to start walking more with her .  Echocardiogram was recently obtained 1/13/2025 that showed normal ejection fraction 60 to 65% with mild to moderate mitral regurgitation and mild prolapse.  No significant change from prior.    Patient notes she has been doing well denies any major cardiac concerns.  Tries going for walks denying any exertional angina or dyspnea.  Patient watches her 8-month-old granddaughter twice a week and notes this keeps her active and busy.  Denies palpitations, lower extremity edema, lightheadedness.        Echocardiogram 1/13/2025 results:  The left ventricle is normal in size.  Left ventricular function is normal.The ejection fraction is 60-65%.  Diastolic Doppler findings  (E/E' ratio and/or other parameters) suggest left  ventricular filling pressures are increased.  Normal right ventricle size and systolic function.  The left atrium is moderately dilated.  There is mild to moderate (1-2+) mitral regurgitation. There is mild prolapse  of the posterior mitral leaflet.  Compared to prior study, there is no significant change.    Holter monitor 12/14/2020  1. Baseline rhythm: Normal sinus rhythm with normal echocardiographic intervals; average heart rate 76 bpm with heart rate ranging between  bpm.       2. Longest pause: No pauses present.      3. QRS morphology: Normal QRS duration     4. Supraventricular ectopy:   26 PACs MI: No atrial tachycardia or atrial fibrillation .      5. Ventricular ectopy:            791 PVC,  burden 0.7% %, PVCs are all singular with no repetitive forms or complexity     6. Atrial fibrillation/flutter:      None     7. Symptomatic transmissions:  8. 3 symptomatic transmissions recorded, patient noted palpitations  9. This seemed associated with singular PVCs     Physical Examination  Review of Systems   Vitals: BP (!) 156/72 (BP Location: Left arm, Patient Position: Sitting, Cuff Size: Adult Regular)   Pulse 72   Resp 16   Ht 1.524 m (5')   Wt 56.2 kg (124 lb)   LMP  (LMP Unknown)   BMI 24.22 kg/m    BMI= Body mass index is 24.22 kg/m .  Wt Readings from Last 3 Encounters:   01/21/25 56.2 kg (124 lb)   05/16/24 59 kg (130 lb)   10/26/23 58.5 kg (128 lb 14.4 oz)           ENT/Mouth: membranes moist, no oral lesions or bleeding gums.      EYES:  no scleral icterus, normal conjunctivae                    Neck: No carotid bruit   Chest/Lungs:   lungs are clear to auscultation, no rales or wheezing,  equal chest wall expansion    Cardiovascular:   Regular. Normal first and second heart sounds with holosystolic murmur, rubs, or gallops; the carotid, radial and posterior tibial pulses are intact, no edema bilaterally        Extremities: no cyanosis or  clubbing   Skin: no xanthelasma, warm.    Neurologic: no tremors     Psychiatric: alert and oriented x3, calm        Please refer above for cardiac ROS details.        Medical History  Surgical History Family History Social History   Past Medical History:   Diagnosis Date     Arthritis      Depression      Dyslipidemia      Epileptic seizures (H)      Essential hypertension      History of transfusion     pt states many years ago     Hyperlipidemia      Hypertension      Mitral regurgitation 2012     Pancreatitis 2017     Postoperative anemia due to acute blood loss 2017     Seizures (H)     last seizure activity Dec 2016     Status post right knee replacement 2017     Past Surgical History:   Procedure Laterality Date     APPENDECTOMY       ARTHROSCOPY KNEE Right      CHOLECYSTECTOMY       DILATION AND CURETTAGE       ENDOSCOPIC RETROGRADE CHOLANGIOPANCREATOGRAM N/A 2017    Procedure: ENDOSCOPIC RETROGRADE CHOLANGIOPANCREATOGRAPHY;  Surgeon: Tomer Bob MD;  Location: [facility] [place];  Service:      ESOPHAGOSCOPY, GASTROSCOPY, DUODENOSCOPY (EGD), COMBINED N/A 2017    Procedure: ENDOSCOPIC ULTRASOUND;  Surgeon: Tomer Bob MD;  Location: [facility] [place];  Service:      GALLBLADDER SURGERY       TONSILLECTOMY       ZZC TOTAL KNEE ARTHROPLASTY Right 2017    Procedure: RIGHT TOTAL KNEE ARTHROPLASTY;  Surgeon: Mukesh Palencia MD;  Location: [facility];  Service: Orthopedics     Family History   Problem Relation Age of Onset     Cerebrovascular Disease Mother      No Known Problems Father      Heart Disease Brother 87.00        he  at home, no autopsy     Colon Cancer Brother      Hypertension Mother      Hypertension Father      No Known Problems Son      Suicidality Son 31.00        cause of death     No Known Problems Son      No Known Problems Daughter      Breast Cancer No family hx of         Social History     Socioeconomic History     Marital status:       Spouse name: Not on file     Number of children: 4     Years of education: Not on file     Highest education level: Not on file   Occupational History     Not on file   Tobacco Use     Smoking status: Never     Smokeless tobacco: Never   Vaping Use     Vaping status: Never Used   Substance and Sexual Activity     Alcohol use: Yes     Comment: Alcoholic Drinks/day: rarely (wine)     Drug use: No     Sexual activity: Not Currently     Partners: Male   Other Topics Concern     Parent/sibling w/ CABG, MI or angioplasty before 65F 55M? No   Social History Narrative    She walks for 30-35 minutes a week.     Social Drivers of Health     Financial Resource Strain: Not on file   Food Insecurity: Not on file   Transportation Needs: Not on file   Physical Activity: Not on file   Stress: Not on file   Social Connections: Not on file   Interpersonal Safety: Not on file   Housing Stability: Not on file           Medications  Allergies   Current Outpatient Medications   Medication Sig Dispense Refill     aspirin (ASA) 81 MG chewable tablet Take 81 mg by mouth daily       Calcium Carbonate (CALCIUM 600 PO)        cholecalciferol (VITAMIN D3) 25 mcg (1000 units) capsule Take 1,000 Units by mouth daily       clindamycin (CLEOCIN) 300 MG capsule Take 1 capsule by mouth as needed Takes prior to dental appts (Patient not taking: Reported on 5/16/2024)       diltiazem ER (TIAZAC) 360 MG 24 hr ER beaded capsule Take 360 mg by mouth every evening       escitalopram (LEXAPRO) 10 MG tablet 1 tablet Orally Once a day for anxiety       levETIRAcetam (KEPPRA) 500 MG tablet Take 1 tablet (500 mg) by mouth daily Take in the evening. 90 tablet 3     levETIRAcetam (KEPPRA) 750 MG tablet Take 1 tablet (750 mg) by mouth daily Take in the morning. 90 tablet 3     losartan (COZAAR) 50 MG tablet Take 1 tablet by mouth daily       nitroFURantoin macrocrystal (MACRODANTIN) 100 MG capsule        simvastatin (ZOCOR) 20 MG tablet Take 20 mg by  "mouth At Bedtime         Allergies   Allergen Reactions     Tetanus Immune Globulin      Other Reaction(s): severe redness     Tetanus Toxoid      Cefuroxime Hives and Rash     Penicillins Hives and Rash          Lab Results    Chemistry/lipid CBC Cardiac Enzymes/BNP/TSH/INR   Recent Labs   Lab Test 11/13/24  0901   CHOL 171   HDL 59   LDL 91   TRIG 103     Recent Labs   Lab Test 11/13/24  0901 11/20/23  1052 11/17/22  1333   LDL 91 96 88     Recent Labs   Lab Test 11/13/24  0901      POTASSIUM 3.9   CHLORIDE 104   CO2 25   *   BUN 19.1   CR 0.72   GFRESTIMATED 84   VIMAL 9.7     Recent Labs   Lab Test 11/13/24  0901 05/16/24  1114 11/20/23  1052   CR 0.72 0.83 0.84     No results for input(s): \"A1C\" in the last 18998 hours.       Recent Labs   Lab Test 11/17/22  1333   WBC 7.1   HGB 14.5   HCT 43.3   MCV 91        Recent Labs   Lab Test 11/17/22  1333 11/15/21  0914 12/20/18  0839   HGB 14.5 14.4 14.3    No results for input(s): \"TROPONINI\" in the last 86869 hours.  No results for input(s): \"BNP\", \"NTBNPI\", \"NTBNP\" in the last 52318 hours.  Recent Labs   Lab Test 11/15/21  0914   TSH 1.88     No results for input(s): \"INR\" in the last 86476 hours.       Princess Schwab PA-C                                         Thank you for allowing me to participate in the care of your patient.      Sincerely,     Princess Schwab PA-C     [facility]  cc:   No referring provider defined for this encounter.      "

## 2025-03-04 ENCOUNTER — LAB REQUISITION (OUTPATIENT)
Dept: LAB | Facility: CLINIC | Age: 85
End: 2025-03-04

## 2025-03-04 DIAGNOSIS — Z01.818 ENCOUNTER FOR OTHER PREPROCEDURAL EXAMINATION: ICD-10-CM

## 2025-03-04 PROCEDURE — 80048 BASIC METABOLIC PNL TOTAL CA: CPT | Performed by: STUDENT IN AN ORGANIZED HEALTH CARE EDUCATION/TRAINING PROGRAM

## 2025-03-04 PROCEDURE — 82374 ASSAY BLOOD CARBON DIOXIDE: CPT | Performed by: STUDENT IN AN ORGANIZED HEALTH CARE EDUCATION/TRAINING PROGRAM

## 2025-03-05 LAB
ANION GAP SERPL CALCULATED.3IONS-SCNC: 13 MMOL/L (ref 7–15)
BUN SERPL-MCNC: 21.2 MG/DL (ref 8–23)
CALCIUM SERPL-MCNC: 10.5 MG/DL (ref 8.8–10.4)
CHLORIDE SERPL-SCNC: 105 MMOL/L (ref 98–107)
CREAT SERPL-MCNC: 0.72 MG/DL (ref 0.51–0.95)
EGFRCR SERPLBLD CKD-EPI 2021: 83 ML/MIN/1.73M2
GLUCOSE SERPL-MCNC: 94 MG/DL (ref 70–99)
HCO3 SERPL-SCNC: 24 MMOL/L (ref 22–29)
POTASSIUM SERPL-SCNC: 3.7 MMOL/L (ref 3.4–5.3)
SODIUM SERPL-SCNC: 142 MMOL/L (ref 135–145)

## 2025-03-22 ENCOUNTER — HEALTH MAINTENANCE LETTER (OUTPATIENT)
Age: 85
End: 2025-03-22

## 2025-05-09 DIAGNOSIS — R56.9 SEIZURES (H): ICD-10-CM

## 2025-05-09 NOTE — TELEPHONE ENCOUNTER
Refill request for: levetiracetam 500mg   Directions: Take 1 tablet (500 mg) by mouth daily Take in the evening.     LOV: 05/16/24  NOV: 05/15/25    90 day supply with 0 refills Medication T'd for review and signature    Virginia Alvarado LPN on 5/9/2025 at 4:25 PM

## 2025-05-12 RX ORDER — LEVETIRACETAM 500 MG/1
500 TABLET ORAL DAILY
Qty: 90 TABLET | Refills: 0 | Status: SHIPPED | OUTPATIENT
Start: 2025-05-12 | End: 2025-05-15

## 2025-05-15 ENCOUNTER — OFFICE VISIT (OUTPATIENT)
Dept: NEUROLOGY | Facility: CLINIC | Age: 85
End: 2025-05-15
Payer: COMMERCIAL

## 2025-05-15 VITALS
HEART RATE: 73 BPM | WEIGHT: 122 LBS | SYSTOLIC BLOOD PRESSURE: 138 MMHG | BODY MASS INDEX: 23.83 KG/M2 | DIASTOLIC BLOOD PRESSURE: 74 MMHG

## 2025-05-15 DIAGNOSIS — Z79.899 ENCOUNTER FOR LONG-TERM (CURRENT) USE OF MEDICATIONS: ICD-10-CM

## 2025-05-15 DIAGNOSIS — R56.9 SEIZURES (H): ICD-10-CM

## 2025-05-15 DIAGNOSIS — G40.802 OTHER EPILEPSY WITHOUT STATUS EPILEPTICUS, NOT INTRACTABLE (H): Primary | ICD-10-CM

## 2025-05-15 RX ORDER — LEVETIRACETAM 500 MG/1
500 TABLET ORAL DAILY
Qty: 90 TABLET | Refills: 3 | Status: SHIPPED | OUTPATIENT
Start: 2025-05-15

## 2025-05-15 RX ORDER — LEVETIRACETAM 750 MG/1
750 TABLET ORAL DAILY
Qty: 90 TABLET | Refills: 3 | Status: SHIPPED | OUTPATIENT
Start: 2025-05-15

## 2025-05-15 NOTE — PATIENT INSTRUCTIONS
PLAN:  Continue the Keppra: 750mg in the morning and 500mg in the evening.  Keppra level at your convenience, at your local clinic.  Order is in.  Return to neurology clinic in 1 year as long as you remain stable.  Let us know if any concerns arise in the meantime.

## 2025-05-15 NOTE — PROGRESS NOTES
ESTABLISHED PATIENT NEUROLOGY NOTE    DATE OF VISIT: 5/15/2025  MRN: [id number]  PATIENT NAME: Silvia Solomon  YOB: 1940    Chief Complaint   Patient presents with    Seizures     No recent seizures.      SUBJECTIVE:                                                      HISTORY OF PRESENT ILLNESS:  Silvia is here for follow up regarding epilepsy.  Saw Elissa Boucher in our clinic here last year.   Silvia is on Keppra: 750mg/500mg for seizure prevention.       Silvia tells me she is doing well.  Seizure-free and no issues with the Keppra.  She is doing fine on the current dosing.    Additional seizure history:  Seizures started in her 20s.  She was on Depakote for a while but had problems with  hair loss so she was switched to Keppra.  Breakthrough seizures in 2016 and 2017, possibly in the setting of missing some doses of Keppra.  When seen last year she reported she was doing well, no recurrence of seizures.  Keppra level was a little bit high (tends to be this way for her) at 46.    Recent left knee surgery so she is continuing to recover from this.      She tells me that she and her  take care of their granddaughter a couple of times per week and really enjoyed this.    CURRENT MEDICATIONS:   Current Outpatient Medications   Medication Sig Dispense Refill    aspirin (ASA) 81 MG chewable tablet Take 81 mg by mouth daily      Calcium Carbonate (CALCIUM 600 PO)       cholecalciferol (VITAMIN D3) 25 mcg (1000 units) capsule Take 1,000 Units by mouth daily      diltiazem ER (TIAZAC) 360 MG 24 hr ER beaded capsule Take 360 mg by mouth every evening      escitalopram (LEXAPRO) 10 MG tablet 1 tablet Orally Once a day for anxiety      levETIRAcetam (KEPPRA) 750 MG tablet Take 1 tablet (750 mg) by mouth daily. Take in the morning. 90 tablet 3    losartan (COZAAR) 50 MG tablet Take 1 tablet by mouth daily      simvastatin (ZOCOR) 20 MG tablet Take 20 mg by mouth At Bedtime      clindamycin (CLEOCIN) 300 MG  capsule Take 1 capsule by mouth as needed Takes prior to dental appts (Patient not taking: Reported on 5/16/2024)      levETIRAcetam (KEPPRA) 500 MG tablet Take 1 tablet (500 mg) by mouth daily. Take in the evening. 90 tablet 3    nitroFURantoin macrocrystal (MACRODANTIN) 100 MG capsule  (Patient not taking: Reported on 5/15/2025)       No current facility-administered medications for this visit.       RECENT DIAGNOSTIC STUDIES:   Labs: No results found for any visits on 05/15/25.    REVIEW OF SYSTEMS:                                                      10-point review of systems is negative except as mentioned above in HPI.    EXAM:                                                      Physical Exam:   Vitals: /74   Pulse 73   Wt 55.3 kg (122 lb)   LMP  (LMP Unknown)   BMI 23.83 kg/m    BMI= Body mass index is 23.83 kg/m .  GENERAL: NAD.  HEENT: NC/AT.  PULM: Non-labored breathing.   Neurologic:  MENTAL STATUS: Alert, attentive. Speech is fluent. Normal comprehension. Normal concentration. Adequate fund of knowledge.   CRANIAL NERVES: Visual fields intact to confrontation. Pupils equally, round and reactive to light. Facial sensation and movement normal. EOM full. Hearing intact to conversation. Trapezius strength intact. Palate moves symmetrically. Tongue midline.  MOTOR: 5/5 in proximal and distal muscle groups of upper and lower extremities. Tone and bulk normal.   SENSATION: Normal light touch and pinprick. Intact proprioception at great toes. Vibration: Normal at both ankles.   COORDINATION: Normal finger nose finger bilaterally.  STATION AND GAIT: Romberg negative.  Casual gait is normal.  Right hand-dominant.     ASSESSMENT and PLAN:                                                      Assessment:    ICD-10-CM    1. Other epilepsy without status epilepticus, not intractable (H)  G40.802       2. Seizures (H)  R56.9 levETIRAcetam (KEPPRA) 500 MG tablet     levETIRAcetam (KEPPRA) 750 MG tablet       3. Encounter for long-term (current) use of medications  Z79.899           Ms. Solomon is a pleasant 84-year-old woman here for follow-up regarding epilepsy.  Stable on Keppra.  We will check a level for medication monitoring purposes and plan to follow-up again in 1 year.  Silvia agrees and will let us know if any concerns arise in the meantime.    Plan:  Continue the Keppra: 750mg in the morning and 500mg in the evening.  Keppra level at your convenience, at your local clinic.  Order is in.  Return to neurology clinic in 1 year as long as you remain stable.  Let us know if any concerns arise in the meantime.    Total Time: 24 minutes were spent with the patient and in chart review/documentation (as described above in Assessment and Plan)/coordinating the care on date of service.    Tatiana Mack MD  Neurology    Dragon software used in the dictation of this note.

## 2025-05-15 NOTE — ADDENDUM NOTE
Addended by: ASHER CHAVARRIA on: 5/15/2025 10:24 AM     Modules accepted: Orders     Coumadin/Warfarin

## 2025-05-15 NOTE — LETTER
5/15/2025      Silvia Solomon  [address] [facility] [address]      Dear Colleague,    Thank you for referring your patient, Silvia Solomon, to the [facility] [place]. Please see a copy of my visit note below.    ESTABLISHED PATIENT NEUROLOGY NOTE    DATE OF VISIT: 5/15/2025  MRN: [id number]  PATIENT NAME: Silvia Solomon  YOB: 1940    Chief Complaint   Patient presents with     Seizures     No recent seizures.      SUBJECTIVE:                                                      HISTORY OF PRESENT ILLNESS:  Silvia is here for follow up regarding epilepsy.  Saw Elissatara Boucher in our clinic here last year.   Silvia is on Keppra: 750mg/500mg for seizure prevention.       Silvia tells me she is doing well.  Seizure-free and no issues with the Keppra.  She is doing fine on the current dosing.    Additional seizure history:  Seizures started in her 20s.  She was on Depakote for a while but had problems with  hair loss so she was switched to Keppra.  Breakthrough seizures in 2016 and 2017, possibly in the setting of missing some doses of Keppra.  When seen last year she reported she was doing well, no recurrence of seizures.  Keppra level was a little bit high (tends to be this way for her) at 46.    Recent left knee surgery so she is continuing to recover from this.      She tells me that she and her  take care of their granddaughter a couple of times per week and really enjoyed this.    CURRENT MEDICATIONS:   Current Outpatient Medications   Medication Sig Dispense Refill     aspirin (ASA) 81 MG chewable tablet Take 81 mg by mouth daily       Calcium Carbonate (CALCIUM 600 PO)        cholecalciferol (VITAMIN D3) 25 mcg (1000 units) capsule Take 1,000 Units by mouth daily       diltiazem ER (TIAZAC) 360 MG 24 hr ER beaded capsule Take 360 mg by mouth every evening       escitalopram (LEXAPRO) 10 MG tablet 1 tablet Orally Once a day for anxiety       levETIRAcetam (KEPPRA) 750  MG tablet Take 1 tablet (750 mg) by mouth daily. Take in the morning. 90 tablet 3     losartan (COZAAR) 50 MG tablet Take 1 tablet by mouth daily       simvastatin (ZOCOR) 20 MG tablet Take 20 mg by mouth At Bedtime       clindamycin (CLEOCIN) 300 MG capsule Take 1 capsule by mouth as needed Takes prior to dental appts (Patient not taking: Reported on 5/16/2024)       levETIRAcetam (KEPPRA) 500 MG tablet Take 1 tablet (500 mg) by mouth daily. Take in the evening. 90 tablet 3     nitroFURantoin macrocrystal (MACRODANTIN) 100 MG capsule  (Patient not taking: Reported on 5/15/2025)       No current facility-administered medications for this visit.       RECENT DIAGNOSTIC STUDIES:   Labs: No results found for any visits on 05/15/25.    REVIEW OF SYSTEMS:                                                      10-point review of systems is negative except as mentioned above in HPI.    EXAM:                                                      Physical Exam:   Vitals: /74   Pulse 73   Wt 55.3 kg (122 lb)   LMP  (LMP Unknown)   BMI 23.83 kg/m    BMI= Body mass index is 23.83 kg/m .  GENERAL: NAD.  HEENT: NC/AT.  PULM: Non-labored breathing.   Neurologic:  MENTAL STATUS: Alert, attentive. Speech is fluent. Normal comprehension. Normal concentration. Adequate fund of knowledge.   CRANIAL NERVES: Visual fields intact to confrontation. Pupils equally, round and reactive to light. Facial sensation and movement normal. EOM full. Hearing intact to conversation. Trapezius strength intact. Palate moves symmetrically. Tongue midline.  MOTOR: 5/5 in proximal and distal muscle groups of upper and lower extremities. Tone and bulk normal.   SENSATION: Normal light touch and pinprick. Intact proprioception at great toes. Vibration: Normal at both ankles.   COORDINATION: Normal finger nose finger bilaterally.  STATION AND GAIT: Romberg negative.  Casual gait is normal.  Right hand-dominant.     ASSESSMENT and PLAN:                                                       Assessment:    ICD-10-CM    1. Other epilepsy without status epilepticus, not intractable (H)  G40.802       2. Seizures (H)  R56.9 levETIRAcetam (KEPPRA) 500 MG tablet     levETIRAcetam (KEPPRA) 750 MG tablet      3. Encounter for long-term (current) use of medications  Z79.899           Ms. Solomon is a pleasant 84-year-old woman here for follow-up regarding epilepsy.  Stable on Keppra.  We will check a level for medication monitoring purposes and plan to follow-up again in 1 year.  Silvia agrees and will let us know if any concerns arise in the meantime.    Plan:  Continue the Keppra: 750mg in the morning and 500mg in the evening.  Keppra level at your convenience, at your local clinic.  Order is in.  Return to neurology clinic in 1 year as long as you remain stable.  Let us know if any concerns arise in the meantime.    Total Time: 24 minutes were spent with the patient and in chart review/documentation (as described above in Assessment and Plan)/coordinating the care on date of service.    Tatiana Mack MD  Neurology    Dragon software used in the dictation of this note.                   Again, thank you for allowing me to participate in the care of your patient.        Sincerely,        Tatiana Mack MD    Electronically signed